# Patient Record
Sex: MALE | Race: WHITE | URBAN - METROPOLITAN AREA
[De-identification: names, ages, dates, MRNs, and addresses within clinical notes are randomized per-mention and may not be internally consistent; named-entity substitution may affect disease eponyms.]

---

## 2018-05-24 ENCOUNTER — OFFICE VISIT (OUTPATIENT)
Dept: INTERNAL MEDICINE CLINIC | Age: 33
End: 2018-05-24

## 2018-05-24 VITALS
TEMPERATURE: 98.1 F | HEIGHT: 69 IN | BODY MASS INDEX: 33.18 KG/M2 | HEART RATE: 73 BPM | DIASTOLIC BLOOD PRESSURE: 73 MMHG | SYSTOLIC BLOOD PRESSURE: 132 MMHG | OXYGEN SATURATION: 97 % | WEIGHT: 224 LBS | RESPIRATION RATE: 16 BRPM

## 2018-05-24 DIAGNOSIS — H91.93 DECREASED HEARING OF BOTH EARS: ICD-10-CM

## 2018-05-24 DIAGNOSIS — Z23 ENCOUNTER FOR IMMUNIZATION: ICD-10-CM

## 2018-05-24 DIAGNOSIS — Z00.00 PHYSICAL EXAM, ANNUAL: Primary | ICD-10-CM

## 2018-05-24 RX ORDER — CETIRIZINE HCL 10 MG
10 TABLET ORAL DAILY
COMMUNITY

## 2018-05-24 NOTE — MR AVS SNAPSHOT
[address] [facility] 
[phone] Patient: Duglas Hendrickson MRN: [id number] TEQ:[id number]/[id number] Visit Information Date & Time Provider Department Dept. Phone Encounter #  
 5/24/2018  9:30 AM Taco Eng, [address] [place] [address],[address] [phone] [id number] Follow-up Instructions Return in about 1 year (around 5/24/2019). Upcoming Health Maintenance Date Due DTaP/Tdap/Td series (1 - Tdap) 9/10/2007 Influenza Age 5 to Adult 8/1/2018 Allergies as of 5/24/2018  Review Complete On: 5/24/2018 By: Taco Eng MD  
 No Known Allergies Current Immunizations  Never Reviewed No immunizations on file. Not reviewed this visit You Were Diagnosed With   
  
 Codes Comments Physical exam, annual    -  Primary ICD-10-CM: Z00.00 ICD-9-CM: V70.0 Decreased hearing of both ears     ICD-10-CM: H91.93 
ICD-9-CM: 389. 9 Vitals BP Pulse Temp Resp Height(growth percentile) Weight(growth percentile) 132/73 (BP 1 Location: Left arm, BP Patient Position: Sitting) 73 98.1 °F (36.7 °C) (Oral) 16 5' 8.75\" (1.746 m) 224 lb (101.6 kg) SpO2 BMI Smoking Status 97% 33.32 kg/m2 Never Smoker Vitals History BMI and BSA Data Body Mass Index Body Surface Area  
 33.32 kg/m 2 2.22 m 2 Preferred Pharmacy Pharmacy Name Phone Jeanna Tr[facility] [phone] Your Updated Medication List  
  
   
This list is accurate as of 5/24/18  9:41 AM.  Always use your most recent med list.  
  
  
  
  
 ZyrTEC 10 mg tablet Generic drug:  cetirizine Take 10 mg by mouth daily. We Performed the Following CBC W/O DIFF [32827 CPT(R)] LIPID PANEL [96845 CPT(R)] METABOLIC PANEL, COMPREHENSIVE [98608 CPT(R)] REFERRAL TO ENT-OTOLARYNGOLOGY [QWA50 Custom] TSH 3RD GENERATION [59456 CPT(R)] URINALYSIS W/ RFLX MICROSCOPIC [72331 CPT(R)] Follow-up Instructions Return in about 1 year (around 5/24/2019). Referral Information Referral ID Referred By Referred To  
  
 [id number] EZEKIEL [address] [facility] [address] [place] [address] Fax: [phone] Visits Status Start Date End Date 1 New Request 5/24/18 5/24/19 If your referral has a status of pending review or denied, additional information will be sent to support the outcome of this decision. Introducing [facility]! Caty Rosales introduces Shareight patient portal. Now you can access parts of your medical record, email your doctor's office, and request medication refills online. 1. In your internet browser, go to [email or web address] 2. Click on the First Time User? Click Here link in the Sign In box. You will see the New Member Sign Up page. 3. Enter your Shareight Access Code exactly as it appears below. You will not need to use this code after youve completed the sign-up process. If you do not sign up before the expiration date, you must request a new code. · Shareight Access Code: [email or web address] Expires: 8/22/2018  9:26 AM 
 
4. Enter the last four digits of your Social Security Number (xxxx) and Date of Birth (mm/dd/yyyy) as indicated and click Submit. You will be taken to the next sign-up page. 5. Create a Chaordixt ID. This will be your Chaordixt login ID and cannot be changed, so think of one that is secure and easy to remember. 6. Create a Chaordixt password. You can change your password at any time. 7. Enter your Password Reset Question and Answer. This can be used at a later time if you forget your password. 8. Enter your e-mail address. You will receive e-mail notification when new information is available in [address] 9. Click Sign Up. You can now view and download portions of your medical record. 10. Click the Download Summary menu link to download a portable copy of your medical information. If you have questions, please visit the Frequently Asked Questions section of the XIPWIRE website. Remember, XIPWIRE is NOT to be used for urgent needs. For medical emergencies, dial 911. Now available from your iPhone and Android! Please provide this summary of care documentation to your next provider. Your primary care clinician is listed as Kathy Rodriguez. If you have any questions after today's visit, please call [phone].

## 2018-05-24 NOTE — PROGRESS NOTES
HISTORY OF PRESENT ILLNESS  Winston Marc is a 33 y.o. male. HPI   Pt is here to establish care. BP is 132/73    Wt is 224 lbs today, up 30 lbs in the last 2 years  He ran the [facility] last year  He is not exercising at the moment  He drinks a lot of caloric beverages  His goal wt is ~170s lbs   Advised him to exercise   Discussed decreasing caloric beverage and increasing water intake    Discussed medical consequences of obesity    Will get labs today     Continues zyrtec OTC 10mg prn for allergies, which works well    Pt sees a counselor for past family issues    Pt c/o soreness to knees and heel when he awakes in the AM  Pt states that his sx improve with movement   Pt wonders if his sx are d/t wt gain  Discussed this being a contributing factor  Discussed deconditioning also being a contributing factor   Discussed him possibly having plantar fascitis   Provided him with exercises to complete at home  Pt is not amenable to completing imaging today    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  Pt states that his wife states that he listens to the TV at a loud volume   Pt wonders if he can complete a hearing eval in this office  Discussed going to an ENT/audiologist prn   Provided referral for an ENT    PMHx:  N/A    FMHx:  Father - passed away from [address] [place] [address] (unsure of type)    PSHx:  [place] teeth removal     SHx:     Never smoker  Alcohol use (once weekly)   with a  baby      PREVENTIVE:  Colonoscopy: not yet needed, denies FMHx colon cancer   PSA: not yet needed  AAA screen: not yet needed  Tdap: ordered 18   Pneumovax: not yet needed  Shingrix: not yet needed  Flu shot: declines, advised him to consider this vaccine  Eye exam: Dr. Simon Leach,  or , annually, due  or  at another ophtha's office   Lipids: ordered 18     There are no active problems to display for this patient. No past surgical history on file.    No results found for: WBC, WBCT, WBCPOC, HGB, HGBPOC, HCT, HCTPOC, PLT, PLTPOC, MCV, MCVPOC, HGBEXT, HCTEXT, PLTEXT  No results found for: CHOL, CHOLPOCT, HDL, LDL, LDLC, LDLCPOC, LDLCEXT, TRIGL, TGLPOCT, CHHD, CHHDX  No results found for: GFRNA, GFRNAPOC, GFRAA, GFRAAPOC, CREA, CREAPOC, BUN, IBUN, BUNPOC, NA, NAPOC, K, KPOCT, CL, CLPOC, CO2, CO2POC, MG, PHOS, ALBEU, PTH, PTHILT, EPO     Review of Systems   Constitutional: Negative for chills and fever. HENT: Positive for hearing loss. Negative for tinnitus. Eyes: Negative for blurred vision and double vision. Respiratory: Negative for shortness of breath and wheezing. Cardiovascular: Negative for chest pain and palpitations. Gastrointestinal: Negative for nausea and vomiting. Genitourinary: Negative for dysuria and frequency. Musculoskeletal: Negative for back pain and falls. Skin: Negative for itching and rash. Neurological: Negative for dizziness, loss of consciousness and headaches. Psychiatric/Behavioral: Negative for depression. The patient is not nervous/anxious. Physical Exam   Constitutional: He is oriented to person, place, and time. He appears well-developed and well-nourished. No distress. HENT:   Head: Normocephalic and atraumatic. Right Ear: External ear normal.   Left Ear: External ear normal.   Mouth/Throat: Oropharynx is clear and moist. No oropharyngeal exudate. Eyes: Conjunctivae and EOM are normal. Pupils are equal, round, and reactive to light. Right eye exhibits no discharge. Left eye exhibits no discharge. No scleral icterus. Neck: Normal range of motion. Neck supple. No thyromegaly present. No carotid bruits    Cardiovascular: Normal rate, regular rhythm, normal heart sounds and intact distal pulses. Exam reveals no gallop and no friction rub. No murmur heard. Pulmonary/Chest: Effort normal and breath sounds normal. No respiratory distress. He has no wheezes. He has no rales. He exhibits no tenderness. Abdominal: Soft.  He exhibits no distension and no mass. There is no tenderness. There is no rebound and no guarding. Musculoskeletal: Normal range of motion. He exhibits no edema, tenderness or deformity. 5/5 strength BLE  No crepitus to knees   Lymphadenopathy:     He has no cervical adenopathy. Neurological: He is alert and oriented to person, place, and time. He has normal reflexes. He exhibits normal muscle tone. Coordination normal.   Skin: Skin is warm and dry. No rash noted. He is not diaphoretic. No erythema. No pallor. Psychiatric: He has a normal mood and affect. His behavior is normal.       ASSESSMENT and PLAN    ICD-10-CM ICD-9-CM    1. Physical exam, annual    Discussed diet and w/l, pt has a a lot of room for improvement in his diet, drinks a lot of sodas, will work on this, will have him get Tdap at his local pharmacy, eye exam UTD, due in 6/18 or 7/18, labs today, declines flu shot, discussed getting a flu shot annually    Z00.00 V70.0 LIPID PANEL      TSH 3RD GENERATION      CBC W/O DIFF      METABOLIC PANEL, COMPREHENSIVE      URINALYSIS W/ RFLX MICROSCOPIC   2. Decreased hearing of both ears    Hearing test    H91.93 389.9 REFERRAL TO ENT-OTOLARYNGOLOGY        Depression screen reviewed and negative. Scribed by Chris Gardner of [address], as dictated by Dr. Maribel Deutsch. Current diagnosis and concerns discussed with pt at length. Pt understands risks and benefits or current treatment plan and medications, and accepts the treatment and medication with any possible risks. Pt asks appropriate questions, which were answered. Pt was instructed to call with any concerns or problems. This note will not be viewable in [address]

## 2018-05-25 LAB
ALBUMIN SERPL-MCNC: 4.4 G/DL (ref 3.5–5.5)
ALBUMIN/GLOB SERPL: 1.4 {RATIO} (ref 1.2–2.2)
ALP SERPL-CCNC: 46 IU/L (ref 39–117)
ALT SERPL-CCNC: 25 IU/L (ref 0–44)
APPEARANCE UR: CLEAR
AST SERPL-CCNC: 20 IU/L (ref 0–40)
BILIRUB SERPL-MCNC: 0.5 MG/DL (ref 0–1.2)
BILIRUB UR QL STRIP: NEGATIVE
BUN SERPL-MCNC: 18 MG/DL (ref 6–20)
BUN/CREAT SERPL: 18 (ref 9–20)
CALCIUM SERPL-MCNC: 9.1 MG/DL (ref 8.7–10.2)
CHLORIDE SERPL-SCNC: 100 MMOL/L (ref 96–106)
CHOLEST SERPL-MCNC: 155 MG/DL (ref 100–199)
CO2 SERPL-SCNC: 25 MMOL/L (ref 18–29)
COLOR UR: YELLOW
CREAT SERPL-MCNC: 0.99 MG/DL (ref 0.76–1.27)
ERYTHROCYTE [DISTWIDTH] IN BLOOD BY AUTOMATED COUNT: 13.1 % (ref 12.3–15.4)
GFR SERPLBLD CREATININE-BSD FMLA CKD-EPI: 101 ML/MIN/1.73
GFR SERPLBLD CREATININE-BSD FMLA CKD-EPI: 117 ML/MIN/1.73
GLOBULIN SER CALC-MCNC: 3.1 G/DL (ref 1.5–4.5)
GLUCOSE SERPL-MCNC: 89 MG/DL (ref 65–99)
GLUCOSE UR QL: NEGATIVE
HCT VFR BLD AUTO: 41.2 % (ref 37.5–51)
HDLC SERPL-MCNC: 52 MG/DL
HGB BLD-MCNC: 14.1 G/DL (ref 13–17.7)
HGB UR QL STRIP: NEGATIVE
KETONES UR QL STRIP: NEGATIVE
LDLC SERPL CALC-MCNC: 85 MG/DL (ref 0–99)
LEUKOCYTE ESTERASE UR QL STRIP: NEGATIVE
MCH RBC QN AUTO: 31.3 PG (ref 26.6–33)
MCHC RBC AUTO-ENTMCNC: 34.2 G/DL (ref 31.5–35.7)
MCV RBC AUTO: 92 FL (ref 79–97)
MICRO URNS: ABNORMAL
NITRITE UR QL STRIP: NEGATIVE
PH UR STRIP: 6 [PH] (ref 5–7.5)
PLATELET # BLD AUTO: 151 X10E3/UL (ref 150–379)
POTASSIUM SERPL-SCNC: 3.8 MMOL/L (ref 3.5–5.2)
PROT SERPL-MCNC: 7.5 G/DL (ref 6–8.5)
PROT UR QL STRIP: NEGATIVE
RBC # BLD AUTO: 4.5 X10E6/UL (ref 4.14–5.8)
SODIUM SERPL-SCNC: 138 MMOL/L (ref 134–144)
SP GR UR: >=1.03 (ref 1–1.03)
TRIGL SERPL-MCNC: 88 MG/DL (ref 0–149)
TSH SERPL DL<=0.005 MIU/L-ACNC: 1.85 UIU/ML (ref 0.45–4.5)
UROBILINOGEN UR STRIP-MCNC: 0.2 MG/DL (ref 0.2–1)
VLDLC SERPL CALC-MCNC: 18 MG/DL (ref 5–40)
WBC # BLD AUTO: 8.5 X10E3/UL (ref 3.4–10.8)

## 2019-06-28 ENCOUNTER — OFFICE VISIT (OUTPATIENT)
Dept: INTERNAL MEDICINE CLINIC | Age: 34
End: 2019-06-28

## 2019-06-28 VITALS
RESPIRATION RATE: 16 BRPM | SYSTOLIC BLOOD PRESSURE: 108 MMHG | BODY MASS INDEX: 31.25 KG/M2 | WEIGHT: 211 LBS | HEART RATE: 67 BPM | DIASTOLIC BLOOD PRESSURE: 68 MMHG | TEMPERATURE: 97.8 F | HEIGHT: 69 IN

## 2019-06-28 DIAGNOSIS — Z00.00 PHYSICAL EXAM, ANNUAL: Primary | ICD-10-CM

## 2019-06-28 DIAGNOSIS — I83.90 VARICOSE VEINS OF LOWER EXTREMITY, UNSPECIFIED LATERALITY, UNSPECIFIED WHETHER COMPLICATED: ICD-10-CM

## 2019-06-28 NOTE — PROGRESS NOTES
HISTORY OF PRESENT ILLNESS  Vasyl Velasco is a 29 y.o. male. HPI   Pt was last here 5/24/18. Pt is here for routine care.      BP is controlled.      Wt is 224 lbs today, down today   Pt has been dealing with depression and ADHD recently which he states has made wl difficult   He has a 3year old at home that is keeping him busy   Not on any meds doing much better now  He states he now has a childcare plan and can make more time for the gym   Discussed diet and wl  Discussed his goal should be below 200 lbs     Reviewed labs 5/18   Will repeat labs today      Continues zyrtec OTC 10mg prn for allergies, which works well       In 2015 pt ran a marathon and had an IT band issue   Pt states the vein is more visible now in his right leg   Discussed these are varicose veins   Discussed this gets worse with age, obesity, standing and sitting   Advised to use compression hose if it bothers him        PREVENTIVE:  Colonoscopy: not yet needed, denies FMHx colon cancer   PSA: not yet needed  AAA screen: not yet needed  Tdap: ordered 05/24/18   Pneumovax: not yet needed  Shingrix: not yet needed  Flu shot: advised to get fall 2019   Eye exam: Dr. Darwin Noe, 6/18 or 7/18, scheduled for this summer 2019  Lipids: 05/24/18 85      There are no active problems to display for this patient. Current Outpatient Medications   Medication Sig Dispense Refill    cetirizine (ZYRTEC) 10 mg tablet Take 10 mg by mouth daily.        Past Surgical History:   Procedure Laterality Date    HX HEENT      wisdom teeth      Lab Results   Component Value Date/Time    WBC 8.5 05/24/2018 09:56 AM    HGB 14.1 05/24/2018 09:56 AM    HCT 41.2 05/24/2018 09:56 AM    PLATELET 438 62/97/6875 09:56 AM    MCV 92 05/24/2018 09:56 AM     Lab Results   Component Value Date/Time    Cholesterol, total 155 05/24/2018 09:56 AM    HDL Cholesterol 52 05/24/2018 09:56 AM    LDL, calculated 85 05/24/2018 09:56 AM    Triglyceride 88 05/24/2018 09:56 AM     Lab Results Component Value Date/Time    GFR est non- 05/24/2018 09:56 AM    GFR est  05/24/2018 09:56 AM    Creatinine 0.99 05/24/2018 09:56 AM    BUN 18 05/24/2018 09:56 AM    Sodium 138 05/24/2018 09:56 AM    Potassium 3.8 05/24/2018 09:56 AM    Chloride 100 05/24/2018 09:56 AM    CO2 25 05/24/2018 09:56 AM        Review of Systems   Respiratory: Negative for shortness of breath. Cardiovascular: Negative for chest pain. Physical Exam   Constitutional: He is oriented to person, place, and time. He appears well-developed and well-nourished. No distress. HENT:   Head: Normocephalic and atraumatic. Right Ear: External ear normal.   Left Ear: External ear normal.   Mouth/Throat: Oropharynx is clear and moist. No oropharyngeal exudate. Eyes: Conjunctivae and EOM are normal. Right eye exhibits no discharge. Left eye exhibits no discharge. Neck: Normal range of motion. Neck supple. No carotid bruits    Cardiovascular: Normal rate, regular rhythm and normal heart sounds. Exam reveals no gallop and no friction rub. No murmur heard. Pulmonary/Chest: Effort normal and breath sounds normal. No respiratory distress. He has no wheezes. He has no rales. He exhibits no tenderness. Abdominal: Soft. He exhibits no distension and no mass. There is no tenderness. There is no rebound and no guarding. Musculoskeletal: Normal range of motion. He exhibits no edema, tenderness or deformity. Lymphadenopathy:     He has no cervical adenopathy. Neurological: He is alert and oriented to person, place, and time. He has normal reflexes. Coordination normal.   Skin: Skin is warm and dry. No rash noted. He is not diaphoretic. No erythema. No pallor. Psychiatric: He has a normal mood and affect. His behavior is normal.       ASSESSMENT and PLAN    ICD-10-CM ICD-9-CM    1.  Physical exam, annual      Discussed diet, wt loss, BP normal, labs ordered, tdap utd, discussed annual flu shot  W04.72 N75.5 METABOLIC PANEL, COMPREHENSIVE      LIPID PANEL      CBC W/O DIFF      TSH 3RD GENERATION   2. Varicose veins of lower extremity, unspecified laterality, unspecified whether complicated      Discussed benign etiology, compression hose, leg elevation and wt loss  D13.40 868.4 METABOLIC PANEL, COMPREHENSIVE      LIPID PANEL      CBC W/O DIFF      TSH 3RD GENERATION    Depression screen reviewed and negative. Scribed by Gaye Mari of [address] [place] [address] [address], as dictated by Dr. Ino Arce.     Current diagnosis and concerns discussed with pt at length. Pt understands risks and benefits or current treatment plan and medications, and accepts the treatment and medication with any possible risks. Pt asks appropriate questions, which were answered. Pt was instructed to call with any concerns or problems.

## 2019-06-29 LAB
ALBUMIN SERPL-MCNC: 4.7 G/DL (ref 3.5–5.5)
ALBUMIN/GLOB SERPL: 1.6 {RATIO} (ref 1.2–2.2)
ALP SERPL-CCNC: 44 IU/L (ref 39–117)
ALT SERPL-CCNC: 25 IU/L (ref 0–44)
AST SERPL-CCNC: 22 IU/L (ref 0–40)
BILIRUB SERPL-MCNC: 0.6 MG/DL (ref 0–1.2)
BUN SERPL-MCNC: 16 MG/DL (ref 6–20)
BUN/CREAT SERPL: 15 (ref 9–20)
CALCIUM SERPL-MCNC: 9.6 MG/DL (ref 8.7–10.2)
CHLORIDE SERPL-SCNC: 101 MMOL/L (ref 96–106)
CHOLEST SERPL-MCNC: 155 MG/DL (ref 100–199)
CO2 SERPL-SCNC: 27 MMOL/L (ref 20–29)
CREAT SERPL-MCNC: 1.04 MG/DL (ref 0.76–1.27)
ERYTHROCYTE [DISTWIDTH] IN BLOOD BY AUTOMATED COUNT: 12.8 % (ref 12.3–15.4)
GLOBULIN SER CALC-MCNC: 2.9 G/DL (ref 1.5–4.5)
GLUCOSE SERPL-MCNC: 88 MG/DL (ref 65–99)
HCT VFR BLD AUTO: 38.4 % (ref 37.5–51)
HDLC SERPL-MCNC: 52 MG/DL
HGB BLD-MCNC: 13.7 G/DL (ref 13–17.7)
LDLC SERPL CALC-MCNC: 85 MG/DL (ref 0–99)
MCH RBC QN AUTO: 31.9 PG (ref 26.6–33)
MCHC RBC AUTO-ENTMCNC: 35.7 G/DL (ref 31.5–35.7)
MCV RBC AUTO: 90 FL (ref 79–97)
PLATELET # BLD AUTO: 119 X10E3/UL (ref 150–450)
POTASSIUM SERPL-SCNC: 4.4 MMOL/L (ref 3.5–5.2)
PROT SERPL-MCNC: 7.6 G/DL (ref 6–8.5)
RBC # BLD AUTO: 4.29 X10E6/UL (ref 4.14–5.8)
SODIUM SERPL-SCNC: 142 MMOL/L (ref 134–144)
TRIGL SERPL-MCNC: 89 MG/DL (ref 0–149)
TSH SERPL DL<=0.005 MIU/L-ACNC: 1.82 UIU/ML (ref 0.45–4.5)
VLDLC SERPL CALC-MCNC: 18 MG/DL (ref 5–40)
WBC # BLD AUTO: 6.2 X10E3/UL (ref 3.4–10.8)

## 2019-06-29 NOTE — PROGRESS NOTES
Mild low plt on lab    This can be an error from clumping at the lab    Repeat cbc in 1 month for further eval

## 2019-07-02 DIAGNOSIS — Z00.00 PHYSICAL EXAM, ANNUAL: ICD-10-CM

## 2019-07-02 DIAGNOSIS — D69.6 DECREASED PLATELET COUNT (HCC): Primary | ICD-10-CM

## 2019-07-02 NOTE — PROGRESS NOTES
Called and spoke to pt. Two pt identifiers confirmed. Pt informed per Dr. Nick Munroe platelets were mildly low. Pt informed per Dr. Nick Munroe this can be an error from clumping at the lab. Pt informed per Dr. Nick Munroe to repeat cbc in 1 month - ordered. Pt verbalized understanding with no further questions.

## 2019-07-19 DIAGNOSIS — D69.6 DECREASED PLATELET COUNT (HCC): ICD-10-CM

## 2019-07-20 LAB
ERYTHROCYTE [DISTWIDTH] IN BLOOD BY AUTOMATED COUNT: 13.2 % (ref 12.3–15.4)
HCT VFR BLD AUTO: 39.9 % (ref 37.5–51)
HGB BLD-MCNC: 13.7 G/DL (ref 13–17.7)
MCH RBC QN AUTO: 31.4 PG (ref 26.6–33)
MCHC RBC AUTO-ENTMCNC: 34.3 G/DL (ref 31.5–35.7)
MCV RBC AUTO: 92 FL (ref 79–97)
PLATELET # BLD AUTO: 135 X10E3/UL (ref 150–450)
RBC # BLD AUTO: 4.36 X10E6/UL (ref 4.14–5.8)
WBC # BLD AUTO: 7.9 X10E3/UL (ref 3.4–10.8)

## 2019-07-23 DIAGNOSIS — D69.6 DECREASED PLATELET COUNT (HCC): Primary | ICD-10-CM

## 2019-07-23 NOTE — PROGRESS NOTES
Called, spoke to pt. Two pt identifiers confirmed. Pt informed per Dr. Eren Card improved, will monitor. Pt informed per Dr. Eren Card repeat cbc no diff in 6 months. Labs ordered and mailed to pt. Pt verbalized understanding of information discussed w/ no further questions at this time.

## 2020-01-17 DIAGNOSIS — D69.6 DECREASED PLATELET COUNT (HCC): ICD-10-CM

## 2020-01-18 LAB
ERYTHROCYTE [DISTWIDTH] IN BLOOD BY AUTOMATED COUNT: 12 % (ref 11.6–15.4)
HCT VFR BLD AUTO: 39.9 % (ref 37.5–51)
HGB BLD-MCNC: 14 G/DL (ref 13–17.7)
MCH RBC QN AUTO: 31.4 PG (ref 26.6–33)
MCHC RBC AUTO-ENTMCNC: 35.1 G/DL (ref 31.5–35.7)
MCV RBC AUTO: 90 FL (ref 79–97)
PLATELET # BLD AUTO: 157 X10E3/UL (ref 150–450)
RBC # BLD AUTO: 4.46 X10E6/UL (ref 4.14–5.8)
WBC # BLD AUTO: 7.3 X10E3/UL (ref 3.4–10.8)

## 2020-10-20 NOTE — PROGRESS NOTES
HISTORY OF PRESENT ILLNESS  Fatoumata Green is a 30 y.o. male. HPI     Pt was last here 6/28/19. Pt is here for routine care. This is an established visit completed with telemedicine was completed with video assist  the patient acknowledges and agrees to this method of visitation doxyme     BP was 108/68 at urgent care     Wt was 211 lbs at urgent care - down 12 lbs x lov    He ran a half marathon at the beginning of this year  Discussed diet and wl and portions      Reviewed labs    He tested negative for covid 9/20  Low platelet levels improved on last labs   Ordered labs   He wonders about checking for lead levels due to being a  in the past   Discussed this is not something I check for and to see if it's done through work      Continues zyrtec OTC 10mg prn for allergies, which works well         PREVENTIVE:  Colonoscopy: not yet needed, denies FMHx colon cancer   PSA: not yet needed  AAA screen: not yet needed  Tdap:05/24/18   Pneumovax: not yet needed  Shingrix: not yet needed  Flu shot: declines   Eye exam: Dr. Noel, 6/20   [facility]       There are no active problems to display for this patient. Current Outpatient Medications   Medication Sig Dispense Refill    cetirizine (ZYRTEC) 10 mg tablet Take 10 mg by mouth daily.        Past Surgical History:   Procedure Laterality Date    HX HEENT      wisdom teeth      Lab Results   Component Value Date/Time    WBC 7.3 01/17/2020 03:40 PM    HGB 14.0 01/17/2020 03:40 PM    HCT 39.9 01/17/2020 03:40 PM    PLATELET 115 52/79/7162 03:40 PM    MCV 90 01/17/2020 03:40 PM     Lab Results   Component Value Date/Time    Cholesterol, total 155 06/28/2019 03:17 PM    HDL Cholesterol 52 06/28/2019 03:17 PM    LDL, calculated 85 06/28/2019 03:17 PM    Triglyceride 89 06/28/2019 03:17 PM     Lab Results   Component Value Date/Time    GFR est non-AA 95 06/28/2019 03:17 PM    GFR est  06/28/2019 03:17 PM    Creatinine 1.04 06/28/2019 03:17 PM    BUN 16 06/28/2019 03:17 PM    Sodium 142 06/28/2019 03:17 PM    Potassium 4.4 06/28/2019 03:17 PM    Chloride 101 06/28/2019 03:17 PM    CO2 27 06/28/2019 03:17 PM        Review of Systems   Respiratory: Negative for shortness of breath. Cardiovascular: Negative for chest pain. Physical Exam  Constitutional:       General: He is not in acute distress. Appearance: Normal appearance. He is not ill-appearing, toxic-appearing or diaphoretic. HENT:      Head: Normocephalic and atraumatic. Eyes:      General:         Right eye: No discharge. Left eye: No discharge. Conjunctiva/sclera: Conjunctivae normal.   Pulmonary:      Effort: No respiratory distress. Neurological:      Mental Status: He is alert and oriented to person, place, and time. Mental status is at baseline. Gait: Gait normal.   Psychiatric:         Mood and Affect: Mood normal.         Behavior: Behavior normal.         ASSESSMENT and PLAN    ICD-10-CM ICD-9-CM    1. Physical exam         Declines flu shot    Wt improved, has nl bp in past    Due for labs    tdap utd        Z00.00 V70.9 LIPID PANEL      METABOLIC PANEL, COMPREHENSIVE      CBC W/O DIFF      TSH 3RD GENERATION   2. Thrombocytopenia (HCC)  D69.6 287.5 LIPID PANEL   Improved on repeat labs   METABOLIC PANEL, COMPREHENSIVE      CBC W/O DIFF      TSH 3RD GENERATION   3. Seasonal allergic rhinitis due to pollen    Zyrtec prn  J30.1 477.0            Scribed by Silvia Persons, as dictated by Dr. Mike Dietz. Current diagnosis and concerns discussed with pt at length. Pt understands risks and benefits or current treatment plan and medications, and accepts the treatment and medication with any possible risks. Pt asks appropriate questions, which were answered. Pt was instructed to call with any concerns or problems. I have reviewed the note documented by the scribe. The services provided are my own.   The documentation is accurate     Theta Plinga Michael Cabrera, who was evaluated through a synchronous (real-time) audio-video encounter, and/or his healthcare decision maker, is aware that it is a billable service, with coverage as determined by his insurance carrier. He provided verbal consent to proceed: Yes, and patient identification was verified. It was conducted pursuant to the emergency declaration under the [address] [place] [address] and the [employer] and [employer] General Act. A caregiver was present when appropriate. Ability to conduct physical exam was limited. I was at home. The patient was at home.

## 2020-10-23 ENCOUNTER — VIRTUAL VISIT (OUTPATIENT)
Dept: INTERNAL MEDICINE CLINIC | Age: 35
End: 2020-10-23
Payer: COMMERCIAL

## 2020-10-23 DIAGNOSIS — J30.1 SEASONAL ALLERGIC RHINITIS DUE TO POLLEN: ICD-10-CM

## 2020-10-23 DIAGNOSIS — D69.6 THROMBOCYTOPENIA (HCC): ICD-10-CM

## 2020-10-23 DIAGNOSIS — Z00.00 PHYSICAL EXAM: Primary | ICD-10-CM

## 2020-10-23 PROCEDURE — 99395 PREV VISIT EST AGE 18-39: CPT | Performed by: INTERNAL MEDICINE

## 2020-12-31 ENCOUNTER — OFFICE VISIT (OUTPATIENT)
Dept: URGENT CARE | Age: 35
End: 2020-12-31
Payer: COMMERCIAL

## 2020-12-31 VITALS — HEART RATE: 66 BPM | RESPIRATION RATE: 14 BRPM | OXYGEN SATURATION: 97 % | TEMPERATURE: 98.2 F

## 2020-12-31 DIAGNOSIS — Z20.822 ENCOUNTER FOR LABORATORY TESTING FOR COVID-19 VIRUS: Primary | ICD-10-CM

## 2020-12-31 PROCEDURE — 99201 PR OFFICE OUTPATIENT NEW 10 MINUTES: CPT | Performed by: NURSE PRACTITIONER

## 2020-12-31 NOTE — PROGRESS NOTES
This patient was seen at [address] [place] [address] [facility]gent Care while in their vehicle due to COVID-19 pandemic with PPE and focused examination in order to decrease community viral transmission. The patient/guardian gave verbal consent to treat. Zully Ortega is a 30 y.o. male presenting to clinic today for COVID-19 testing. 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 States that he needs to be tested for his employer. Denies any symptoms. The history is provided by the patient. History limited by: nothing. No past medical history on file.      Past Surgical History:   Procedure Laterality Date    HX HEENT      wisdom teeth         Family History   Problem Relation Age of Onset    Cancer Father         Social History     Socioeconomic History    Marital status:      Spouse name: Not on file    Number of children: Not on file    Years of education: Not on file    Highest education level: Not on file   Occupational History    Not on file   Social Needs    Financial resource strain: Not on file    Food insecurity     Worry: Not on file     Inability: Not on file    Transportation needs     Medical: Not on file     Non-medical: Not on file   Tobacco Use    Smoking status: Never Smoker    Smokeless tobacco: Never Used   Substance and Sexual Activity    Alcohol use: Yes     Comment: once weekly     Drug use: No    Sexual activity: Yes     Partners: Female     Birth control/protection: None   Lifestyle    Physical activity     Days per week: Not on file     Minutes per session: Not on file    Stress: Not on file   Relationships    Social connections     Talks on phone: Not on file     Gets together: Not on file     Attends Sabianist service: Not on file     Active member of club or organization: Not on file     Attends meetings of clubs or organizations: Not on file     Relationship status: Not on file    Intimate partner violence     Fear of current or ex partner: Not on file     Emotionally abused: Not on file     Physically abused: Not on file     Forced sexual activity: Not on file   Other Topics Concern    Not on file   Social History Narrative    Not on file                ALLERGIES: Patient has no known allergies. Review of Systems   Constitutional: Negative for chills and fever. HENT: Negative for congestion, rhinorrhea and sinus pain. Respiratory: Negative for cough, chest tightness and shortness of breath. Cardiovascular: Negative for chest pain. Vitals:    12/31/20 1431   Pulse: 66   Resp: 14   Temp: 98.2 °F (36.8 °C)   SpO2: 97%       Physical Exam  Vitals signs and nursing note reviewed. Constitutional:       General: He is not in acute distress. Appearance: Normal appearance. He is not ill-appearing, toxic-appearing or diaphoretic. HENT:      Head: Normocephalic and atraumatic. Eyes:      Extraocular Movements: Extraocular movements intact. Conjunctiva/sclera: Conjunctivae normal.   Neck:      Musculoskeletal: Normal range of motion. Cardiovascular:      Rate and Rhythm: Normal rate. Pulmonary:      Effort: Pulmonary effort is normal. No respiratory distress. Comments: Speaking in complete sentences without difficulty. Musculoskeletal: Normal range of motion. Neurological:      General: No focal deficit present. Mental Status: He is alert. Psychiatric:         Mood and Affect: Mood normal.         Behavior: Behavior normal.         Thought Content: Thought content normal.         Judgment: Judgment normal.         MDM  PLAN:  Patient presents to clinic for COVID-19 test for his employer. Reports secondary exposure through his child, whose teacher tested positive for the virus. 1. COVID-19 test obtained. Patient to quarantine until results return.  Discussed with patient that it is likely too soon for him to be exhibiting symptoms or to have a reliable test if he did get the virus from the teacher through his child. If he develops symptoms, he should return for another test.  2. OTC if symptoms develop. 3. Follow up with PCP as needed. 4. Go to ED with development of any acute symptoms. DIAGNOSIS:    ICD-10-CM ICD-9-CM    1.  Encounter for laboratory testing for COVID-19 virus  Z20.828 V01.79 NOVEL CORONAVIRUS (COVID-19)

## 2021-01-02 LAB — SARS-COV-2, NAA: NOT DETECTED

## 2021-03-23 ENCOUNTER — TELEPHONE (OUTPATIENT)
Dept: INTERNAL MEDICINE CLINIC | Age: 36
End: 2021-03-23

## 2021-03-23 DIAGNOSIS — Z20.5 EXPOSURE TO HEPATITIS B: Primary | ICD-10-CM

## 2021-03-23 NOTE — TELEPHONE ENCOUNTER
----- Message from Noemy Dani sent at 3/23/2021  4:16 PM EDT -----  Regarding: Dr Marco Palma Message/Vendor Calls    Caller's first and last name: Pt      Reason for call: lab work      Callback required yes/no and why: yes. To discuss      Best contact number(s):[phone]       Details to clarify the request: Pt stated that Dr Angie Cruz has ordered blood work.  He would like to add \"something personal\" to the lab work and is asking for a call back to discuss       Message from [facility]

## 2021-03-24 NOTE — TELEPHONE ENCOUNTER
Pt returning call. Please call  [phone]    Pt states he would like to discuss the issue with dr Jorge Schilder directly and not anyone else, he states its a personal matter.

## 2021-03-25 NOTE — TELEPHONE ENCOUNTER
Called out and spoke to pt. Two pt identifiers confirmed. Pt states that his wife tested positive for HPV w/in last week or 2. Pt is concerned as she was tested 3yrs ago and was negative. Had HPV vaccine in '97. Wife has pending repeat labs. Pt states they have no other sexual partners and are confused w/ where this has come from. Pt is concerned if he has it and would liked to have a lab check w/ his routine labs. Pt asking what he would have to do if he or his wife are positive moving forward. Informed pt to await wife's repeat results. Pt informed Dr. Kiersten Sanabria will be consulted for further recommendations.

## 2021-03-26 NOTE — TELEPHONE ENCOUNTER
Called out and spoke to pt. Two pt identifiers confirmed. Pt states that his wife's liver enzymes were elevated and they are testing his wife for HBV and not HPV. Pt states that his wife has yet to receive her results. Informed pt on how it is transmitted and to withhold from sexual activity if concern for infection. Pt asking if he can get checked if he has or was exposed to HBV at one point. Pt informed Dr. Nick Stokes will be consulted for further recommendations. Pt verbalized understanding of information discussed w/ no further questions at this time.

## 2021-03-26 NOTE — TELEPHONE ENCOUNTER
Patient states he needs a call back to Further discuss information from yesterday as patient is concerned he used the incorrect Acronyms. Please call to discuss.  Thank you

## 2021-03-29 ENCOUNTER — TELEPHONE (OUTPATIENT)
Dept: INTERNAL MEDICINE CLINIC | Age: 36
End: 2021-03-29

## 2021-03-31 NOTE — TELEPHONE ENCOUNTER
Called out and spoke to pt. Two pt identifiers confirmed. Pt informed per Dr. Trisha Chavez that Hep B labs ordered. Pt states he will come in Friday morning for labs. Pt states his wife's results do show Hep B. Pt asking if he should do the HepB vaccine booster. Pt verbalized understanding of information discussed w/ no further questions at this time.

## 2021-04-03 LAB
ALBUMIN SERPL-MCNC: 4.2 G/DL (ref 3.5–5)
ALBUMIN/GLOB SERPL: 1.3 {RATIO} (ref 1.1–2.2)
ALP SERPL-CCNC: 44 U/L (ref 45–117)
ALT SERPL-CCNC: 33 U/L (ref 12–78)
ANION GAP SERPL CALC-SCNC: 4 MMOL/L (ref 5–15)
AST SERPL-CCNC: 23 U/L (ref 15–37)
BILIRUB SERPL-MCNC: 0.5 MG/DL (ref 0.2–1)
BUN SERPL-MCNC: 18 MG/DL (ref 6–20)
BUN/CREAT SERPL: 21 (ref 12–20)
CALCIUM SERPL-MCNC: <10 MG/DL (ref 8.5–10.1)
CHLORIDE SERPL-SCNC: 107 MMOL/L (ref 97–108)
CHOLEST SERPL-MCNC: 147 MG/DL
CO2 SERPL-SCNC: 27 MMOL/L (ref 21–32)
CREAT SERPL-MCNC: 0.87 MG/DL (ref 0.7–1.3)
ERYTHROCYTE [DISTWIDTH] IN BLOOD BY AUTOMATED COUNT: 11.9 % (ref 11.5–14.5)
GLOBULIN SER CALC-MCNC: 3.2 G/DL (ref 2–4)
GLUCOSE SERPL-MCNC: 69 MG/DL (ref 65–100)
HBV SURFACE AB SER QL: NONREACTIVE
HBV SURFACE AB SER-ACNC: <3.1 MIU/ML
HBV SURFACE AG SER QL: 0.4 INDEX
HBV SURFACE AG SER QL: NEGATIVE
HCT VFR BLD AUTO: 39.9 % (ref 36.6–50.3)
HDLC SERPL-MCNC: 49 MG/DL
HDLC SERPL: 3 {RATIO} (ref 0–5)
HGB BLD-MCNC: 13.8 G/DL (ref 12.1–17)
LDLC SERPL CALC-MCNC: 80.2 MG/DL (ref 0–100)
LIPID PROFILE,FLP: NORMAL
MCH RBC QN AUTO: 31.4 PG (ref 26–34)
MCHC RBC AUTO-ENTMCNC: 34.6 G/DL (ref 30–36.5)
MCV RBC AUTO: 90.7 FL (ref 80–99)
NRBC # BLD: 0 K/UL (ref 0–0.01)
NRBC BLD-RTO: 0 PER 100 WBC
PLATELET # BLD AUTO: 123 K/UL (ref 150–400)
PMV BLD AUTO: 12.5 FL (ref 8.9–12.9)
POTASSIUM SERPL-SCNC: 4.4 MMOL/L (ref 3.5–5.1)
PROT SERPL-MCNC: 7.4 G/DL (ref 6.4–8.2)
RBC # BLD AUTO: 4.4 M/UL (ref 4.1–5.7)
SODIUM SERPL-SCNC: 138 MMOL/L (ref 136–145)
TRIGL SERPL-MCNC: 89 MG/DL (ref ?–150)
TSH SERPL DL<=0.05 MIU/L-ACNC: 1.62 UIU/ML (ref 0.36–3.74)
VLDLC SERPL CALC-MCNC: 17.8 MG/DL
WBC # BLD AUTO: 5.1 K/UL (ref 4.1–11.1)

## 2021-04-05 DIAGNOSIS — D69.6 THROMBOCYTOPENIA (HCC): Primary | ICD-10-CM

## 2021-04-05 NOTE — PROGRESS NOTES
Called, spoke to pt  Received two pt identifiers  Pt informed per Dr. Saul Jesus Mild low platelets  Pt informed per Dr. Saul Jesus would like to repeat labs in 1 month, ordered and mailed to pt. Rest labs are okay  Pt asked about Hep B panel. Pt informed panel came back negative however also negative for antibodies. Pt advised can come in anytime this week to get the Hep B vaccine. Pt verbalizes understanding of the instructions and has no further questions at this time.

## 2021-04-07 LAB
HBV CORE AB SERPL QL IA: NEGATIVE
HBV CORE IGM SERPL QL IA: NEGATIVE

## 2021-04-09 ENCOUNTER — CLINICAL SUPPORT (OUTPATIENT)
Dept: INTERNAL MEDICINE CLINIC | Age: 36
End: 2021-04-09
Payer: COMMERCIAL

## 2021-04-09 DIAGNOSIS — Z23 ENCOUNTER FOR IMMUNIZATION: Primary | ICD-10-CM

## 2021-04-09 PROCEDURE — 90746 HEPB VACCINE 3 DOSE ADULT IM: CPT | Performed by: INTERNAL MEDICINE

## 2021-04-09 PROCEDURE — 90471 IMMUNIZATION ADMIN: CPT | Performed by: INTERNAL MEDICINE

## 2021-04-09 NOTE — PROGRESS NOTES
GUY per Dr. Ghanshyam Braxton, 1st Hep B vaccine given after obtaining the consent form. 1.0 ml injected in the L deltoid muscle intramuscularly. Administered by Vivi Le LPN. VIS given. Informed pt on when to return for both the 2nd and 3rd vaccines. Pt verbalized understanding of information discussed w/ no further questions at this time.

## 2021-04-19 ENCOUNTER — TELEPHONE (OUTPATIENT)
Dept: INTERNAL MEDICINE CLINIC | Age: 36
End: 2021-04-19

## 2021-04-19 NOTE — TELEPHONE ENCOUNTER
----- Message from Tayo Shabazz sent at 4/19/2021 11:51 AM EDT -----  Regarding: Dr. Carmela Chowdhury  General Message/Vendor Calls    Caller's first and last name: Pt       Reason for call: Needs to speak with nurse Charles Hutchins about some current issues that is going on. Callback required yes/no and why: yes, to discuss        Best contact number(s): [phone]      Details to clarify the request: Has already spoken with him just has some follow up questions.        Message from [facility]

## 2021-04-20 NOTE — TELEPHONE ENCOUNTER
MD Tiffani Purcell LPN   Caller: Unspecified Kelby Pack, 11:53 AM)             Yes abstain until treatment is complete can discuss further with her gastroenterologist as well      Left message for patient to return call

## 2021-04-20 NOTE — TELEPHONE ENCOUNTER
Spoke with patient. Two pt identifiers confirmed. Patient states that his wife contracted Hep B. Patient states that she is now seeing a specialist.  Patient states that he has had his first booster for Hep B, but would like to know if they need to abstain for sexual activity until he has received the third shot and has had labs to check for immunity. Advised patient that I will check with Dr. Leo Massey and will give him a call back as soon as she responds. Pt verbalized understanding of information discussed w/ no further questions at this time.

## 2021-04-20 NOTE — TELEPHONE ENCOUNTER
Spoke with patient. Two pt identifiers confirmed. Patient advised per Dr. Snyder Click that he should abstain from sexual intercourse until Hep B treatment is complete. Pt verbalized understanding of information discussed w/ no further questions at this time.

## 2021-05-07 ENCOUNTER — CLINICAL SUPPORT (OUTPATIENT)
Dept: INTERNAL MEDICINE CLINIC | Age: 36
End: 2021-05-07
Payer: COMMERCIAL

## 2021-05-07 DIAGNOSIS — Z23 ENCOUNTER FOR IMMUNIZATION: Primary | ICD-10-CM

## 2021-05-07 PROCEDURE — 90746 HEPB VACCINE 3 DOSE ADULT IM: CPT | Performed by: INTERNAL MEDICINE

## 2021-05-07 PROCEDURE — 90471 IMMUNIZATION ADMIN: CPT | Performed by: INTERNAL MEDICINE

## 2021-05-07 NOTE — PROGRESS NOTES
GUY per Dr. Rodolfo Pedroza, 2nd Hep B vaccine given after obtaining the consent form. 1.0 ml injected in the L deltoid muscle intramuscularly. Administered by Krystle rGay LPN. VIS given.

## 2021-11-15 NOTE — PROGRESS NOTES
HISTORY OF PRESENT ILLNESS  Fany Vallecillo is a 29 y.o. male. HPI     Pt was last here 10/23/20. Pt is here for routine care.     He wonders about getting vasectomy, will provide referral to uro     He also mentions issues with call center and office communication regarding appt    He wonders about monoclonal antibody infusions for covid-19 if he were to test positive  Discussed this and Covid vaccine at length    He wonders about checking vit D     He also wonders about checking body fat percentage      He was at urgent care 12/31/20 for covid testing    He reports having a corneal abrasion went to urgent care and a specialist for this  This has resolved     BP today is controlled     Wt was 211 lbs lov  Discussed diet and wl and portions, [facility]  Will provide referral to wt/l clinic  He has been doing intermittent fasting     Reviewed labs    Platelets were low, discussed that if low again will f/u with hematologist to check     Continues zyrtec OTC 10mg prn for allergies, which works well      PREVENTIVE:  Colonoscopy: not yet needed, denies FMHx colon cancer   PSA: not yet needed  AAA screen: not yet needed  Tdap:05/24/18   Pneumovax: not yet needed  Shingrix: not yet needed  Flu shot: declines   Eye exam: 6/21  Lipids: 4/21 LDL 80  Hep B: will get final shot in series today 11/16/21  Covid: declines    There are no problems to display for this patient. Current Outpatient Medications   Medication Sig Dispense Refill    cetirizine (ZYRTEC) 10 mg tablet Take 10 mg by mouth daily.        Past Surgical History:   Procedure Laterality Date    HX HEENT      wisdom teeth      Lab Results   Component Value Date/Time    WBC 5.1 04/02/2021 08:15 AM    HGB 13.8 04/02/2021 08:15 AM    HCT 39.9 04/02/2021 08:15 AM    PLATELET 408 (L) 68/86/7107 08:15 AM    MCV 90.7 04/02/2021 08:15 AM     Lab Results   Component Value Date/Time    Cholesterol, total 147 04/02/2021 08:15 AM    HDL Cholesterol 49 04/02/2021 08:15 AM    LDL, calculated 80.2 04/02/2021 08:15 AM    Triglyceride 89 04/02/2021 08:15 AM    CHOL/HDL Ratio 3.0 04/02/2021 08:15 AM     Lab Results   Component Value Date/Time    GFR est non-AA >60 04/02/2021 08:15 AM    GFR est AA >60 04/02/2021 08:15 AM    Creatinine 0.87 04/02/2021 08:15 AM    BUN 18 04/02/2021 08:15 AM    Sodium 138 04/02/2021 08:15 AM    Potassium 4.4 04/02/2021 08:15 AM    Chloride 107 04/02/2021 08:15 AM    CO2 27 04/02/2021 08:15 AM        Review of Systems   Respiratory: Negative for shortness of breath. Cardiovascular: Negative for chest pain. Physical Exam  Constitutional:       General: He is not in acute distress. Appearance: Normal appearance. He is not ill-appearing, toxic-appearing or diaphoretic. HENT:      Head: Normocephalic and atraumatic. Right Ear: External ear normal.      Left Ear: External ear normal.   Eyes:      General:         Right eye: No discharge. Left eye: No discharge. Conjunctiva/sclera: Conjunctivae normal.      Pupils: Pupils are equal, round, and reactive to light. Cardiovascular:      Rate and Rhythm: Normal rate and regular rhythm. Heart sounds: No murmur heard. No friction rub. No gallop. Pulmonary:      Effort: No respiratory distress. Breath sounds: Normal breath sounds. No wheezing or rales. Chest:      Chest wall: No tenderness. Musculoskeletal:         General: Normal range of motion. Cervical back: Normal range of motion and neck supple. Skin:     General: Skin is warm and dry. Neurological:      Mental Status: He is alert and oriented to person, place, and time. Mental status is at baseline. Coordination: Coordination normal.      Gait: Gait normal.   Psychiatric:         Mood and Affect: Mood normal.         Behavior: Behavior normal.         ASSESSMENT and PLAN    ICD-10-CM ICD-9-CM    1.  Physical exam      Z00.00 V70.9 HEPATITIS C AB      LIPID PANEL   Due for labs ordered    Working on weight loss would like to see weight loss clinic place referral to interested in vasectomy referred to urology colonoscopy at age 40 declines flu shot and Covid vaccine discussed this with him    Last hepatitis B vaccine today blood pressure controlled   METABOLIC PANEL, COMPREHENSIVE      CBC W/O DIFF      TSH 3RD GENERATION      HEMOGLOBIN A1C WITH EAG   2. Thrombocytopenia ([address]  D69.6 287.5 HEPATITIS C AB      LIPID PANEL      METABOLIC PANEL, COMPREHENSIVE   Mild low platelets sporadically when we have checked we will repeat blood counts today if platelets still low will refer to hematology for further evaluation discussed at length yet   CBC W/O DIFF      TSH 3RD GENERATION      HEMOGLOBIN A1C WITH EAG        Scribed by Tara Layne of [facility] Parry, as dictated by Dr. Mike Fregoso. Current diagnosis and concerns discussed with pt at length. Pt understands risks and benefits or current treatment plan and medications, and accepts the treatment and medication with any possible risks. Pt asks appropriate questions, which were answered. Pt was instructed to call with any concerns or problems. I have reviewed the note documented by the scribe. The services provided are my own.   The documentation is accurate

## 2021-11-16 ENCOUNTER — OFFICE VISIT (OUTPATIENT)
Dept: INTERNAL MEDICINE CLINIC | Age: 36
End: 2021-11-16
Payer: COMMERCIAL

## 2021-11-16 VITALS
TEMPERATURE: 97.9 F | HEIGHT: 68 IN | HEART RATE: 64 BPM | SYSTOLIC BLOOD PRESSURE: 114 MMHG | BODY MASS INDEX: 32.49 KG/M2 | RESPIRATION RATE: 16 BRPM | DIASTOLIC BLOOD PRESSURE: 75 MMHG | WEIGHT: 214.4 LBS | OXYGEN SATURATION: 96 %

## 2021-11-16 DIAGNOSIS — Z00.00 PHYSICAL EXAM: Primary | ICD-10-CM

## 2021-11-16 DIAGNOSIS — Z23 ENCOUNTER FOR IMMUNIZATION: ICD-10-CM

## 2021-11-16 DIAGNOSIS — E55.9 VITAMIN D DEFICIENCY: ICD-10-CM

## 2021-11-16 DIAGNOSIS — D69.6 THROMBOCYTOPENIA (HCC): ICD-10-CM

## 2021-11-16 PROCEDURE — 90746 HEPB VACCINE 3 DOSE ADULT IM: CPT | Performed by: INTERNAL MEDICINE

## 2021-11-16 PROCEDURE — 90471 IMMUNIZATION ADMIN: CPT | Performed by: INTERNAL MEDICINE

## 2021-11-16 PROCEDURE — 99395 PREV VISIT EST AGE 18-39: CPT | Performed by: INTERNAL MEDICINE

## 2021-11-22 ENCOUNTER — LAB ONLY (OUTPATIENT)
Dept: INTERNAL MEDICINE CLINIC | Age: 36
End: 2021-11-22

## 2021-11-22 DIAGNOSIS — D69.6 THROMBOCYTOPENIA (HCC): ICD-10-CM

## 2021-11-22 DIAGNOSIS — E55.9 VITAMIN D DEFICIENCY: ICD-10-CM

## 2021-11-22 DIAGNOSIS — Z00.00 PHYSICAL EXAM: ICD-10-CM

## 2021-11-23 DIAGNOSIS — D69.6 TEMPORARY LOW PLATELET COUNT (HCC): ICD-10-CM

## 2021-11-23 DIAGNOSIS — E55.9 VITAMIN D DEFICIENCY: Primary | ICD-10-CM

## 2021-11-23 LAB
25(OH)D3 SERPL-MCNC: 18.9 NG/ML (ref 30–100)
ALBUMIN SERPL-MCNC: 4.2 G/DL (ref 3.5–5)
ALBUMIN/GLOB SERPL: 1.2 {RATIO} (ref 1.1–2.2)
ALP SERPL-CCNC: 57 U/L (ref 45–117)
ALT SERPL-CCNC: 35 U/L (ref 12–78)
ANION GAP SERPL CALC-SCNC: 6 MMOL/L (ref 5–15)
AST SERPL-CCNC: 21 U/L (ref 15–37)
BILIRUB SERPL-MCNC: 0.6 MG/DL (ref 0.2–1)
BUN SERPL-MCNC: 16 MG/DL (ref 6–20)
BUN/CREAT SERPL: 17 (ref 12–20)
CALCIUM SERPL-MCNC: 9.3 MG/DL (ref 8.5–10.1)
CHLORIDE SERPL-SCNC: 102 MMOL/L (ref 97–108)
CHOLEST SERPL-MCNC: 164 MG/DL
CO2 SERPL-SCNC: 29 MMOL/L (ref 21–32)
CREAT SERPL-MCNC: 0.95 MG/DL (ref 0.7–1.3)
ERYTHROCYTE [DISTWIDTH] IN BLOOD BY AUTOMATED COUNT: 11.8 % (ref 11.5–14.5)
EST. AVERAGE GLUCOSE BLD GHB EST-MCNC: 103 MG/DL
GLOBULIN SER CALC-MCNC: 3.4 G/DL (ref 2–4)
GLUCOSE SERPL-MCNC: 87 MG/DL (ref 65–100)
HBA1C MFR BLD: 5.2 % (ref 4–5.6)
HCT VFR BLD AUTO: 41.3 % (ref 36.6–50.3)
HCV AB SERPL QL IA: NONREACTIVE
HDLC SERPL-MCNC: 60 MG/DL
HDLC SERPL: 2.7 {RATIO} (ref 0–5)
HGB BLD-MCNC: 14.2 G/DL (ref 12.1–17)
LDLC SERPL CALC-MCNC: 88.8 MG/DL (ref 0–100)
MCH RBC QN AUTO: 31.3 PG (ref 26–34)
MCHC RBC AUTO-ENTMCNC: 34.4 G/DL (ref 30–36.5)
MCV RBC AUTO: 91 FL (ref 80–99)
NRBC # BLD: 0 K/UL (ref 0–0.01)
NRBC BLD-RTO: 0 PER 100 WBC
PLATELET # BLD AUTO: 141 K/UL (ref 150–400)
PMV BLD AUTO: 12.3 FL (ref 8.9–12.9)
POTASSIUM SERPL-SCNC: 4.1 MMOL/L (ref 3.5–5.1)
PROT SERPL-MCNC: 7.6 G/DL (ref 6.4–8.2)
RBC # BLD AUTO: 4.54 M/UL (ref 4.1–5.7)
SODIUM SERPL-SCNC: 137 MMOL/L (ref 136–145)
TRIGL SERPL-MCNC: 76 MG/DL (ref ?–150)
TSH SERPL DL<=0.05 MIU/L-ACNC: 2.02 UIU/ML (ref 0.36–3.74)
VLDLC SERPL CALC-MCNC: 15.2 MG/DL
WBC # BLD AUTO: 9.4 K/UL (ref 4.1–11.1)

## 2021-11-23 RX ORDER — ERGOCALCIFEROL 1.25 MG/1
50000 CAPSULE ORAL
Qty: 8 CAPSULE | Refills: 0 | Status: SHIPPED | OUTPATIENT
Start: 2021-11-23 | End: 2021-11-23 | Stop reason: CLARIF

## 2021-11-23 RX ORDER — ERGOCALCIFEROL 1.25 MG/1
50000 CAPSULE ORAL
Qty: 8 CAPSULE | Refills: 0 | Status: SHIPPED | OUTPATIENT
Start: 2021-11-23

## 2021-11-23 NOTE — PROGRESS NOTES
Please call patient back about results  vit D is low--the patient needs 50,000 units weekly for 8 weeks then start a daily 2000unit supplement instead.    Mild low platelets send to hematology for further eval

## 2021-11-23 NOTE — PROGRESS NOTES
Called, spoke with Pt. Two pt identifiers confirmed. Pt informed of lab results and recommendations per Dr. Luis Shah. Pt agrees to take the once weekly Vit D then switch to 2000 units daily. Pt also asked for referral to Hematology to be mailed to him as well. Pt verbalized understanding of information discussed w/ no further questions at this time.

## 2021-12-08 ENCOUNTER — TELEPHONE (OUTPATIENT)
Dept: INTERNAL MEDICINE CLINIC | Age: 36
End: 2021-12-08

## 2021-12-08 NOTE — TELEPHONE ENCOUNTER
----- Message from Hurleyyarely Cook sent at 12/7/2021  5:03 PM EST -----  Subject: Message to Provider    QUESTIONS  Information for Provider? Patient called in to verify insurance due to   incorrect billing with Labs and regular check up with PCP Jeff Byrd; please call to confirm details with patient   ---------------------------------------------------------------------------  --------------  [address]  What is the best way for the office to contact you? OK to leave message on   voicemail  Preferred Call Back Phone Number?  [phone]  ---------------------------------------------------------------------------  --------------  SCRIPT ANSWERS  undefined

## 2021-12-08 NOTE — TELEPHONE ENCOUNTER
Called patient and verified patient's insurance carrier, plan, ID number and claim address that we have on file. Copy of insurance card scanned in on 11/16/21.

## 2021-12-28 NOTE — PROGRESS NOTES
Carolina Curiel is a 29 y.o. male here for new patient appt for thrombocytopenia. Referred by Dr Amrik Mendoza. Labs done 11/22/21  Plt 141  Pt states he platelets have been up and down over the past 3 years so his doctor wanted him to check it out. 1. Have you been to the ER, urgent care clinic since your last visit? Hospitalized since your last visit? New Pt    2. Have you seen or consulted any other health care providers outside of the [address] [place] [address] since your last visit? Include any pap smears or colon screening.  New Pt

## 2021-12-30 ENCOUNTER — OFFICE VISIT (OUTPATIENT)
Dept: ONCOLOGY | Age: 36
End: 2021-12-30
Payer: COMMERCIAL

## 2021-12-30 VITALS
HEIGHT: 68 IN | BODY MASS INDEX: 31.71 KG/M2 | TEMPERATURE: 98.5 F | WEIGHT: 209.2 LBS | HEART RATE: 76 BPM | SYSTOLIC BLOOD PRESSURE: 127 MMHG | DIASTOLIC BLOOD PRESSURE: 73 MMHG | OXYGEN SATURATION: 96 %

## 2021-12-30 DIAGNOSIS — D69.6 THROMBOCYTOPENIA (HCC): Primary | ICD-10-CM

## 2021-12-30 PROCEDURE — 99203 OFFICE O/P NEW LOW 30 MIN: CPT | Performed by: INTERNAL MEDICINE

## 2021-12-30 NOTE — PROGRESS NOTES
[address] [facility] [address], [address] [facility], [address] [facility], [address] [place] [address]  [phone]        Hematology Note        Patient: Rachel Ramey MRN: [id number]  SSN: [id number]    YOB: 1985  Age: 29 y.o. Sex: male        Subjective:      Rachel Ramey is a 29 y.o. male who I am seeing in consultation per the request of Dr. Valerie Nevarez for mild thrombocytopenia. It has been present for over 3 yrs. He underwent a routine physical including laboratory which showed mild thrombocytopenia. He denies bruising or bleeding. Review of Systems:    Constitutional: negative  Eyes: negative  Ears, Nose, Mouth, Throat, and Face: negative  Respiratory: negative  Cardiovascular: negative  Gastrointestinal: negative  Genitourinary:negative  Integument/Breast: negative  Hematologic/Lymphatic: negative  Musculoskeletal:negative  Neurological: negative      History reviewed. No pertinent past medical history. Past Surgical History:   Procedure Laterality Date    HX HEENT      wisdom teeth      Family History   Problem Relation Age of Onset    Cancer Father      Social History     Tobacco Use    Smoking status: Never Smoker    Smokeless tobacco: Never Used   Substance Use Topics    Alcohol use: Yes     Comment: once weekly       Prior to Admission medications    Medication Sig Start Date End Date Taking? Authorizing Provider   ergocalciferol (ERGOCALCIFEROL) 1,250 mcg (50,000 unit) capsule Take 1 Capsule by mouth every seven (7) days. 11/23/21  Yes Cesar West MD   cetirizine (ZYRTEC) 10 mg tablet Take 10 mg by mouth daily.    Yes Provider, Historical              No Known Allergies        Objective:     Vitals:    12/30/21 1536   BP: 127/73   Pulse: 76   Temp: 98.5 °F (36.9 °C)   TempSrc: Temporal   SpO2: 96%   Weight: 209 lb 3.2 oz (94.9 kg)   Height: 5' 8\" (1.727 m)            Physical Exam:    GENERAL: alert, cooperative, no distress, appears stated age  EYE: conjunctivae/corneas clear  LYMPHATIC: Cervical, supraclavicular, and axillary nodes normal.   THROAT & NECK: normal and no erythema or exudates noted. LUNG: clear to auscultation bilaterally  HEART: regular rate and rhythm, S1, S2 normal, no murmur, click, rub or gallop  ABDOMEN: soft, non-tender  EXTREMITIES:  extremities normal, atraumatic,  SKIN: Normal.  NEUROLOGIC: AOx3. Gait normal. Reflexes and motor strength normal and symmetric. Cranial nerves 2-12 and sensation grossly intact. Lab Results   Component Value Date/Time    WBC 9.4 11/22/2021 11:36 AM    HGB 14.2 11/22/2021 11:36 AM    HCT 41.3 11/22/2021 11:36 AM    PLATELET 612 (L) 50/70/9642 11:36 AM    MCV 91.0 11/22/2021 11:36 AM              Assessment:     1. Mild thrombocytopenia:    The patient's hemoglobin, white blood cell and differential blood counts are normal.   Benign  Asymptomatic      Plan:       > No other active intervention is needed. Signed by: Carlos Minor MD                     December 30, 2021       CC.  Daniel London MD

## 2021-12-30 NOTE — LETTER
12/30/2021    Patient: Doug Velasco   YOB: 1985   Date of Visit: 12/30/2021     Keenan Mensah, [address]  [address] [facility] [address]  [address] [address]  [place]    Dear eKenan Mensah MD,      Thank you for referring Mr. Doug Velasco to [address] [place] [address] for evaluation. My notes for this consultation are attached. If you have questions, please do not hesitate to call me. I look forward to following your patient along with you.       Sincerely,    Claudetta Harrier, MD

## 2022-06-06 ENCOUNTER — VIRTUAL VISIT (OUTPATIENT)
Dept: INTERNAL MEDICINE CLINIC | Age: 37
End: 2022-06-06
Payer: COMMERCIAL

## 2022-06-06 ENCOUNTER — TELEPHONE (OUTPATIENT)
Dept: INTERNAL MEDICINE CLINIC | Age: 37
End: 2022-06-06

## 2022-06-06 DIAGNOSIS — U07.1 COVID-19: Primary | ICD-10-CM

## 2022-06-06 DIAGNOSIS — E66.9 OBESITY (BMI 30.0-34.9): ICD-10-CM

## 2022-06-06 PROCEDURE — 99213 OFFICE O/P EST LOW 20 MIN: CPT | Performed by: INTERNAL MEDICINE

## 2022-06-06 RX ORDER — NIRMATRELVIR AND RITONAVIR 300-100 MG
3 KIT ORAL EVERY 12 HOURS
Qty: 1 BOX | Refills: 0 | Status: SHIPPED | OUTPATIENT
Start: 2022-06-06

## 2022-06-06 NOTE — PROGRESS NOTES
HISTORY OF PRESENT ILLNESS  Pattie Serrano is a 29 y.o. male. HPI     Last here 11/16/21. Pt is here for acute care. This is an established visit completed with telemedicine was completed with video assist  the patient acknowledges and agrees to this method of visitation doxyme    He tested positive for covid  His 3year old got it first, 4 month old might have it as well did not test but had fever  Yesterday he started to have fever, chills, had high fever of 103 degrees  Has been taking acetaminophen and ibuprofen  He went to [facility], tested positive for covid, was told he was not eligible for paxlovid  Discussed that this is wrong, he qualifies for paxlovid based on weight  He has not had any of his covid shots  Will order paxlovid, discussed that this could cause a metallic taste and potential for rebound  Discussed that it is ok to continue acetaminophen and ibuprofen, and if breathing starts to become a problem that is a reason to go to the ED      Wt last visit was 209 lbs, stable x lov per pt  He is running, starting marathon training    There are no problems to display for this patient. Current Outpatient Medications   Medication Sig Dispense Refill    nirmatrelvir-ritonavir (Paxlovid, EUA,) 150 mg x 2- 100 mg tablet Take 3 Tablets by mouth every twelve (12) hours. 60< GFR 1 Box 0    ergocalciferol (ERGOCALCIFEROL) 1,250 mcg (50,000 unit) capsule Take 1 Capsule by mouth every seven (7) days. 8 Capsule 0    cetirizine (ZYRTEC) 10 mg tablet Take 10 mg by mouth daily.        Past Surgical History:   Procedure Laterality Date    HX HEENT      wisdom teeth      Lab Results   Component Value Date/Time    WBC 9.4 11/22/2021 11:36 AM    HGB 14.2 11/22/2021 11:36 AM    HCT 41.3 11/22/2021 11:36 AM    PLATELET 549 (L) 52/98/1299 11:36 AM    MCV 91.0 11/22/2021 11:36 AM     Lab Results   Component Value Date/Time    Cholesterol, total 164 11/22/2021 11:36 AM    HDL Cholesterol 60 11/22/2021 11:36 AM    LDL, calculated 88.8 11/22/2021 11:36 AM    Triglyceride 76 11/22/2021 11:36 AM    CHOL/HDL Ratio 2.7 11/22/2021 11:36 AM     Lab Results   Component Value Date/Time    GFR est non-AA >60 11/22/2021 11:36 AM    GFR est AA >60 11/22/2021 11:36 AM    Creatinine 0.95 11/22/2021 11:36 AM    BUN 16 11/22/2021 11:36 AM    Sodium 137 11/22/2021 11:36 AM    Potassium 4.1 11/22/2021 11:36 AM    Chloride 102 11/22/2021 11:36 AM    CO2 29 11/22/2021 11:36 AM        Review of Systems   Constitutional: Positive for chills and fever. Respiratory: Negative for shortness of breath. Cardiovascular: Negative for chest pain. Physical Exam  Constitutional:       General: He is not in acute distress. Appearance: Normal appearance. He is not ill-appearing, toxic-appearing or diaphoretic. HENT:      Head: Normocephalic and atraumatic. Eyes:      General:         Right eye: No discharge. Left eye: No discharge. Conjunctiva/sclera: Conjunctivae normal.   Pulmonary:      Effort: No respiratory distress. Neurological:      Mental Status: He is alert and oriented to person, place, and time. Mental status is at baseline. Gait: Gait normal.   Psychiatric:         Mood and Affect: Mood normal.         Behavior: Behavior normal.         ASSESSMENT and PLAN    ICD-10-CM ICD-9-CM    1. COVID-19       Patient diagnosed with COVID    Discussed treatment options    He is not vaccinated    He is stable    Discussed respiratory symptoms to look out for things to warrant going to the emergency department such as difficulty breathing low oxygen    He meets criteria for paxlovid due to obesity    Reviewed last labs GFR is greater than 60 he does not have any medications that interact with this have ordered the medication to the pharmacy    Discussed contact precautions     U07.1 079.89    2. Obesity (BMI 30.0-34. 9)     Work on weight loss E66.9 278.00         Depression screen reviewed and negative     Scribed by Tonya Vilchis Dl Mirza, as dictated by Dr. Nolberto Mckeon. Current diagnosis and concerns discussed with pt at length. Pt understands risks and benefits or current treatment plan and medications, and accepts the treatment and medication with any possible risks. Pt asks appropriate questions, which were answered. Pt was instructed to call with any concerns or problems. I have reviewed the note documented by the scribe. The services provided are my own. The documentation is accurate     Evelyn Sosa, was evaluated through a synchronous (real-time) audio-video encounter. The patient (or guardian if applicable) is aware that this is a billable service, which includes applicable co-pays. This Virtual Visit was conducted with patient's (and/or legal guardian's) consent. The visit was conducted pursuant to the emergency declaration under the [facility], [address] [place] [address] [facility] authority and the [employer] and [employer] General Act. Patient identification was verified, and a caregiver was present when appropriate. The patient was located at: Home: [facility]  The provider was located at:  Facility (Appt Department): [address] [place] [address]

## 2022-06-06 NOTE — TELEPHONE ENCOUNTER
Patient needs a call back Positive for COVID to discuss Plan of care & any medications that could possibly be prescribed for symptoms. Please call.  Thank you

## 2022-06-06 NOTE — TELEPHONE ENCOUNTER
Called, spoke to pt. Two pt identifiers confirmed. Patient states he needs appointment for Covid  Patient offered and accepted VV appointment 06/06/2022 at 10:15    Pt verbalized understanding of information discussed w/ no further questions at this time.

## 2023-04-19 ENCOUNTER — OFFICE VISIT (OUTPATIENT)
Dept: INTERNAL MEDICINE CLINIC | Age: 38
End: 2023-04-19
Payer: COMMERCIAL

## 2023-04-19 VITALS
TEMPERATURE: 98.4 F | RESPIRATION RATE: 16 BRPM | HEART RATE: 81 BPM | OXYGEN SATURATION: 97 % | DIASTOLIC BLOOD PRESSURE: 77 MMHG | WEIGHT: 219.6 LBS | HEIGHT: 68 IN | BODY MASS INDEX: 33.28 KG/M2 | SYSTOLIC BLOOD PRESSURE: 122 MMHG

## 2023-04-19 DIAGNOSIS — Z00.00 PHYSICAL EXAM: Primary | ICD-10-CM

## 2023-04-19 DIAGNOSIS — R21 RASH: ICD-10-CM

## 2023-04-19 PROCEDURE — 99395 PREV VISIT EST AGE 18-39: CPT | Performed by: INTERNAL MEDICINE

## 2023-04-19 RX ORDER — CEPHALEXIN 500 MG/1
500 CAPSULE ORAL 4 TIMES DAILY
Qty: 40 CAPSULE | Refills: 0 | Status: SHIPPED | OUTPATIENT
Start: 2023-04-19 | End: 2023-04-29

## 2023-04-19 RX ORDER — VALACYCLOVIR HYDROCHLORIDE 1 G/1
1000 TABLET, FILM COATED ORAL 3 TIMES DAILY
Qty: 21 TABLET | Refills: 0 | Status: SHIPPED | OUTPATIENT
Start: 2023-04-19 | End: 2023-04-26

## 2023-04-19 NOTE — PROGRESS NOTES
HISTORY OF PRESENT ILLNESS  Kodi Bazzi is a 40 y.o. male. HPI    Last here vv 6/6/23. Here for acute/routine care. Pt reports sharp pain in his neck and a bump on his head x 4 days as well as bumps on his neck, and behind his ear x 1 day. States the bumps on his neck and head are painful/sensitive. He thinks the bump on his head has grown since onset. Denies any known recent scratches of his head, though he is a  and does a fair amount of grinding. Usually wears some kind of cap on at work. On exam: Papules on scalp, blistering, 1.5 inch diameter   3 reactive lymph nodes on R side of neck and behind R ear     Discussed bumps on his neck are reactive lymph nodes due to likely infectious rash. Rx'd keflex and valtrex. Advised pt to have his wife check, and if rash and lymph nodes grow significantly to call back sooner. BP today is 122/77     Wt today is 219 lbs, up 8 lbs since lov   He is working with a therapist on emotional eating, working on intermittent fasting, trying to avoid sugars     Reviewed labs    Ordered labs, of note, he ate today so will come back for fasting labs     Platelets were previously low   He saw Dr. Ashley Dickey (onc) for this 12/30/21  Reviewed note:  Assessment:    1. Mild thrombocytopenia:     The patient's hemoglobin, white blood cell and differential blood counts are normal.   Benign  Asymptomatic  Plan:    > No other active intervention is needed.     Does not need to f/U     Continues zyrtec OTC 10mg prn for allergies, which works well    Was taking vit D but stopped after he ran out       PREVENTIVE:  Colonoscopy: not yet needed, denies FMHx colon cancer   PSA: not yet needed  AAA screen: not yet needed  Tdap:05/24/18   Pneumovax: not yet needed  Shingrix: not yet needed  Flu shot: declines   Eye exam: 8/22, q annually   Lipids: 11/21 LDL 88   A1C: 11/21 5.2   Hep B: complete 11/16/21  Hep C: 11/21 neg  Covid: declines      There is no problem list on file for this patient. Current Outpatient Medications   Medication Sig Dispense Refill    nirmatrelvir-ritonavir (Paxlovid, EUA,) 150 mg x 2- 100 mg tablet Take 3 Tablets by mouth every twelve (12) hours. 60< GFR 1 Box 0    ergocalciferol (ERGOCALCIFEROL) 1,250 mcg (50,000 unit) capsule Take 1 Capsule by mouth every seven (7) days. 8 Capsule 0    cetirizine (ZYRTEC) 10 mg tablet Take 10 mg by mouth daily. Past Surgical History:   Procedure Laterality Date    HX HEENT      wisdom teeth         Lab Results   Component Value Date    WBC 9.4 11/22/2021    HGB 14.2 11/22/2021    HCT 41.3 11/22/2021    MCV 91.0 11/22/2021     (L) 11/22/2021     Lab Results   Component Value Date/Time    Cholesterol, total 164 11/22/2021 11:36 AM    HDL Cholesterol 60 11/22/2021 11:36 AM    LDL, calculated 88.8 11/22/2021 11:36 AM    VLDL, calculated 15.2 11/22/2021 11:36 AM    Triglyceride 76 11/22/2021 11:36 AM    CHOL/HDL Ratio 2.7 11/22/2021 11:36 AM     Lab Results   Component Value Date/Time    GFR est AA >60 11/22/2021 11:36 AM    GFR est non-AA >60 11/22/2021 11:36 AM    Creatinine 0.95 11/22/2021 11:36 AM    BUN 16 11/22/2021 11:36 AM    Sodium 137 11/22/2021 11:36 AM    Potassium 4.1 11/22/2021 11:36 AM    Chloride 102 11/22/2021 11:36 AM    CO2 29 11/22/2021 11:36 AM         Review of Systems   Respiratory:  Negative for shortness of breath. Cardiovascular:  Negative for chest pain. Skin:  Positive for rash. Physical Exam  Constitutional:       General: He is not in acute distress. Appearance: Normal appearance. He is not ill-appearing, toxic-appearing or diaphoretic. HENT:      Head: Normocephalic and atraumatic. Right Ear: External ear normal.      Left Ear: External ear normal.   Eyes:      General:         Right eye: No discharge. Left eye: No discharge. Conjunctiva/sclera: Conjunctivae normal.      Pupils: Pupils are equal, round, and reactive to light.    Neck:      Vascular: No carotid bruit. Comments: 3 reactive lymph nodes on R side of neck and behind R ear   Cardiovascular:      Rate and Rhythm: Normal rate and regular rhythm. Heart sounds: No murmur heard. No friction rub. No gallop. Pulmonary:      Effort: No respiratory distress. Breath sounds: Normal breath sounds. No wheezing or rales. Chest:      Chest wall: No tenderness. Musculoskeletal:         General: Normal range of motion. Cervical back: Normal range of motion. Skin:     General: Skin is warm and dry. Comments: Papules on scalp, blistering, 1.5 inch diameter    Neurological:      General: No focal deficit present. Mental Status: He is oriented to person, place, and time. Gait: Gait normal.   Psychiatric:         Mood and Affect: Mood normal.         Behavior: Behavior normal.         ASSESSMENT and PLAN    ICD-10-CM ICD-9-CM    1. Physical exam  Z00.00 V70.9 LIPID PANEL      METABOLIC PANEL, COMPREHENSIVE   Eye exam up-to-date    Labs overdue ordered    Declines flu shot and COVID-vaccine    Blood pressure controlled    Weight unfortunately has climbed he denies any back on track with this    We will contact you for fasting labs   HEMOGLOBIN A1C WITH EAG      TSH 3RD GENERATION      CBC W/O DIFF      VITAMIN D, 25 HYDROXY      2. Rash  R21 782. 1    Patient with infectious papules possible impetigo? Perhaps atypical shingles? Has adenopathy stemming from this rash--we will treat for cellulitis/impetigo with Keflex no sign of abscess will also cover for possible shingles without tracts we will have him return to clinic next week to ensure that he is improving    Discussed warning signs to look out for: increasing adenopathy fevers increasing erythema       Depression screen reviewed and negative. Scribed by Yelena Monaco, as dictated by Dr. Jayden Meredith. Current diagnosis and concerns discussed with pt at length.  Pt understands risks and benefits or current treatment plan and medications, and accepts the treatment and medication with any possible risks. Pt asks appropriate questions, which were answered. Pt was instructed to call with any concerns or problems. I have reviewed the note documented by the scribe. The services provided are my own. The documentation is accurate.

## 2023-04-21 ENCOUNTER — LAB ONLY (OUTPATIENT)
Dept: INTERNAL MEDICINE CLINIC | Age: 38
End: 2023-04-21

## 2023-04-21 DIAGNOSIS — Z00.00 PHYSICAL EXAM: ICD-10-CM

## 2023-04-21 LAB
25(OH)D3 SERPL-MCNC: 25.7 NG/ML (ref 30–100)
ALBUMIN SERPL-MCNC: 4.3 G/DL (ref 3.5–5)
ALBUMIN/GLOB SERPL: 1.2 (ref 1.1–2.2)
ALP SERPL-CCNC: 46 U/L (ref 45–117)
ALT SERPL-CCNC: 39 U/L (ref 12–78)
ANION GAP SERPL CALC-SCNC: 5 MMOL/L (ref 5–15)
AST SERPL-CCNC: 28 U/L (ref 15–37)
BILIRUB SERPL-MCNC: 0.6 MG/DL (ref 0.2–1)
BUN SERPL-MCNC: 14 MG/DL (ref 6–20)
BUN/CREAT SERPL: 14 (ref 12–20)
CALCIUM SERPL-MCNC: 8.9 MG/DL (ref 8.5–10.1)
CHLORIDE SERPL-SCNC: 104 MMOL/L (ref 97–108)
CHOLEST SERPL-MCNC: 151 MG/DL
CO2 SERPL-SCNC: 26 MMOL/L (ref 21–32)
CREAT SERPL-MCNC: 0.97 MG/DL (ref 0.7–1.3)
ERYTHROCYTE [DISTWIDTH] IN BLOOD BY AUTOMATED COUNT: 11.5 % (ref 11.5–14.5)
EST. AVERAGE GLUCOSE BLD GHB EST-MCNC: 103 MG/DL
GLOBULIN SER CALC-MCNC: 3.6 G/DL (ref 2–4)
GLUCOSE SERPL-MCNC: 93 MG/DL (ref 65–100)
HBA1C MFR BLD: 5.2 % (ref 4–5.6)
HCT VFR BLD AUTO: 39.2 % (ref 36.6–50.3)
HDLC SERPL-MCNC: 61 MG/DL
HDLC SERPL: 2.5 (ref 0–5)
HGB BLD-MCNC: 13.5 G/DL (ref 12.1–17)
LDLC SERPL CALC-MCNC: 80.2 MG/DL (ref 0–100)
MCH RBC QN AUTO: 31 PG (ref 26–34)
MCHC RBC AUTO-ENTMCNC: 34.4 G/DL (ref 30–36.5)
MCV RBC AUTO: 89.9 FL (ref 80–99)
NRBC # BLD: 0 K/UL (ref 0–0.01)
NRBC BLD-RTO: 0 PER 100 WBC
PLATELET # BLD AUTO: 132 K/UL (ref 150–400)
PMV BLD AUTO: 11.9 FL (ref 8.9–12.9)
POTASSIUM SERPL-SCNC: 3.8 MMOL/L (ref 3.5–5.1)
PROT SERPL-MCNC: 7.9 G/DL (ref 6.4–8.2)
RBC # BLD AUTO: 4.36 M/UL (ref 4.1–5.7)
SODIUM SERPL-SCNC: 135 MMOL/L (ref 136–145)
TRIGL SERPL-MCNC: 49 MG/DL (ref ?–150)
TSH SERPL DL<=0.05 MIU/L-ACNC: 1.7 UIU/ML (ref 0.36–3.74)
VLDLC SERPL CALC-MCNC: 9.8 MG/DL
WBC # BLD AUTO: 4.5 K/UL (ref 4.1–11.1)

## 2023-04-25 ENCOUNTER — OFFICE VISIT (OUTPATIENT)
Dept: INTERNAL MEDICINE CLINIC | Age: 38
End: 2023-04-25

## 2023-04-25 VITALS
RESPIRATION RATE: 16 BRPM | HEIGHT: 68 IN | SYSTOLIC BLOOD PRESSURE: 113 MMHG | HEART RATE: 78 BPM | OXYGEN SATURATION: 100 % | DIASTOLIC BLOOD PRESSURE: 77 MMHG | BODY MASS INDEX: 33.01 KG/M2 | WEIGHT: 217.8 LBS | TEMPERATURE: 99 F

## 2023-04-25 DIAGNOSIS — H91.93 DECREASED HEARING OF BOTH EARS: ICD-10-CM

## 2023-04-25 DIAGNOSIS — D69.6 THROMBOCYTOPENIA (HCC): ICD-10-CM

## 2023-04-25 DIAGNOSIS — E55.9 VITAMIN D DEFICIENCY: ICD-10-CM

## 2023-04-25 DIAGNOSIS — R21 RASH: Primary | ICD-10-CM

## 2023-04-25 DIAGNOSIS — E66.9 OBESITY (BMI 30.0-34.9): ICD-10-CM

## 2023-06-05 ASSESSMENT — ENCOUNTER SYMPTOMS: SHORTNESS OF BREATH: 0

## 2023-06-06 ENCOUNTER — OFFICE VISIT (OUTPATIENT)
Age: 38
End: 2023-06-06
Payer: COMMERCIAL

## 2023-06-06 VITALS
SYSTOLIC BLOOD PRESSURE: 117 MMHG | HEIGHT: 68 IN | HEART RATE: 63 BPM | TEMPERATURE: 98.8 F | DIASTOLIC BLOOD PRESSURE: 76 MMHG | WEIGHT: 196 LBS | OXYGEN SATURATION: 98 % | RESPIRATION RATE: 16 BRPM | BODY MASS INDEX: 29.7 KG/M2

## 2023-06-06 DIAGNOSIS — F51.01 PRIMARY INSOMNIA: ICD-10-CM

## 2023-06-06 DIAGNOSIS — E66.9 OBESITY (BMI 30.0-34.9): ICD-10-CM

## 2023-06-06 DIAGNOSIS — R21 RASH: ICD-10-CM

## 2023-06-06 DIAGNOSIS — L42 PITYRIASIS ROSEA: Primary | ICD-10-CM

## 2023-06-06 PROCEDURE — 99213 OFFICE O/P EST LOW 20 MIN: CPT | Performed by: INTERNAL MEDICINE

## 2023-06-06 RX ORDER — TRIAMCINOLONE ACETONIDE 1 MG/G
CREAM TOPICAL
Qty: 45 G | Refills: 0 | Status: SHIPPED | OUTPATIENT
Start: 2023-06-06

## 2023-06-06 SDOH — ECONOMIC STABILITY: HOUSING INSECURITY
IN THE LAST 12 MONTHS, WAS THERE A TIME WHEN YOU DID NOT HAVE A STEADY PLACE TO SLEEP OR SLEPT IN A SHELTER (INCLUDING NOW)?: NO

## 2023-06-06 SDOH — ECONOMIC STABILITY: FOOD INSECURITY: WITHIN THE PAST 12 MONTHS, YOU WORRIED THAT YOUR FOOD WOULD RUN OUT BEFORE YOU GOT MONEY TO BUY MORE.: NEVER TRUE

## 2023-06-06 SDOH — ECONOMIC STABILITY: FOOD INSECURITY: WITHIN THE PAST 12 MONTHS, THE FOOD YOU BOUGHT JUST DIDN'T LAST AND YOU DIDN'T HAVE MONEY TO GET MORE.: NEVER TRUE

## 2023-06-06 SDOH — ECONOMIC STABILITY: INCOME INSECURITY: HOW HARD IS IT FOR YOU TO PAY FOR THE VERY BASICS LIKE FOOD, HOUSING, MEDICAL CARE, AND HEATING?: NOT HARD AT ALL

## 2023-06-06 ASSESSMENT — PATIENT HEALTH QUESTIONNAIRE - PHQ9
SUM OF ALL RESPONSES TO PHQ9 QUESTIONS 1 & 2: 0
SUM OF ALL RESPONSES TO PHQ QUESTIONS 1-9: 0
SUM OF ALL RESPONSES TO PHQ QUESTIONS 1-9: 0
1. LITTLE INTEREST OR PLEASURE IN DOING THINGS: 0
2. FEELING DOWN, DEPRESSED OR HOPELESS: 0
SUM OF ALL RESPONSES TO PHQ QUESTIONS 1-9: 0
SUM OF ALL RESPONSES TO PHQ QUESTIONS 1-9: 0

## 2023-06-06 NOTE — PROGRESS NOTES
1. \"Have you been to the ER, urgent care clinic since your last visit? Hospitalized since your last visit? \" no    2. \"Have you seen or consulted any other health care providers outside of the [address] [place] [address] since your last visit? \" no     3. For patients aged 39-70: Has the patient had a colonoscopy / FIT/ Cologuard?  N/A
ergocalciferol (ERGOCALCIFEROL) 1.25 MG (96924 UT) capsule Take 50,000 Units by mouth every 7 days       No current facility-administered medications for this visit. Past Surgical History:   Procedure Laterality Date    HEENT      wisdom teeth         Lab Results   Component Value Date    WBC 4.5 04/21/2023    HGB 13.5 04/21/2023    HCT 39.2 04/21/2023    MCV 89.9 04/21/2023     (L) 04/21/2023     Lab Results   Component Value Date    CHOL 151 04/21/2023    TRIG 49 04/21/2023    HDL 61 04/21/2023    LDLCALC 80.2 04/21/2023     Lab Results   Component Value Date    CREATININE 0.97 04/21/2023    BUN 14 04/21/2023     (L) 04/21/2023    K 3.8 04/21/2023     04/21/2023    CO2 26 04/21/2023       Review of Systems   Respiratory:  Negative for shortness of breath. Cardiovascular:  Negative for chest pain. Skin:  Positive for rash. Physical Exam  Constitutional:       General: He is not in acute distress. Appearance: He is not ill-appearing, toxic-appearing or diaphoretic. HENT:      Head: Normocephalic and atraumatic. Eyes:      General:         Right eye: No discharge. Left eye: No discharge. Extraocular Movements: Extraocular movements intact. Cardiovascular:      Rate and Rhythm: Normal rate and regular rhythm. Heart sounds: No murmur heard. Pulmonary:      Effort: Pulmonary effort is normal. No respiratory distress. Breath sounds: Normal breath sounds. Musculoskeletal:         General: No swelling, tenderness or deformity. Cervical back: Normal range of motion and neck supple. No tenderness. Lymphadenopathy:      Cervical: No cervical adenopathy. Skin:     General: Skin is warm and dry. Findings: No erythema. Comments: Pityriasis rosea   Neurological:      General: No focal deficit present. Mental Status: He is alert and oriented to person, place, and time. Mental status is at baseline.       Gait: Gait normal.

## 2024-02-05 ENCOUNTER — TELEPHONE (OUTPATIENT)
Age: 39
End: 2024-02-05

## 2024-02-05 DIAGNOSIS — Z00.00 ENCOUNTER FOR GENERAL ADULT MEDICAL EXAMINATION WITHOUT ABNORMAL FINDINGS: ICD-10-CM

## 2024-02-05 DIAGNOSIS — E55.9 VITAMIN D DEFICIENCY, UNSPECIFIED: Primary | ICD-10-CM

## 2024-02-05 DIAGNOSIS — D69.6 THROMBOCYTOPENIA (HCC): ICD-10-CM

## 2024-02-05 NOTE — TELEPHONE ENCOUNTER
States seeing specialist for adhd  NP Марина Rodrigues  Prescribed welbutrin for 1 month trial  Advised if doesn't work, pt will need blood panel done before other med can be prescribed.  Pt states welbutrin not working, thus requesting pcp to order blood work.  Thinks will need vit d (as was low on that previously) but not sure what else.      Call pt to discuss and advise  [phone]

## 2024-02-07 NOTE — TELEPHONE ENCOUNTER
Called, Spoke with Pt  Received two pt identifiers  VORB per Dr. Patino labs and Vit D lab ordered as well  Pt verbalizes understanding of the instructions and has no further questions at this time.

## 2024-02-09 ENCOUNTER — NURSE ONLY (OUTPATIENT)
Age: 39
End: 2024-02-09

## 2024-02-09 DIAGNOSIS — Z00.00 ENCOUNTER FOR GENERAL ADULT MEDICAL EXAMINATION WITHOUT ABNORMAL FINDINGS: ICD-10-CM

## 2024-02-09 DIAGNOSIS — D69.6 THROMBOCYTOPENIA (HCC): ICD-10-CM

## 2024-02-09 DIAGNOSIS — E55.9 VITAMIN D DEFICIENCY, UNSPECIFIED: ICD-10-CM

## 2024-02-10 LAB
25(OH)D3 SERPL-MCNC: 27.1 NG/ML (ref 30–100)
ALBUMIN SERPL-MCNC: 4.4 G/DL (ref 3.5–5)
ALBUMIN/GLOB SERPL: 1.2 (ref 1.1–2.2)
ALP SERPL-CCNC: 47 U/L (ref 45–117)
ALT SERPL-CCNC: 27 U/L (ref 12–78)
ANION GAP SERPL CALC-SCNC: 7 MMOL/L (ref 5–15)
AST SERPL-CCNC: 16 U/L (ref 15–37)
BILIRUB SERPL-MCNC: 0.4 MG/DL (ref 0.2–1)
BUN SERPL-MCNC: 18 MG/DL (ref 6–20)
BUN/CREAT SERPL: 17 (ref 12–20)
CALCIUM SERPL-MCNC: 9.3 MG/DL (ref 8.5–10.1)
CHLORIDE SERPL-SCNC: 105 MMOL/L (ref 97–108)
CHOLEST SERPL-MCNC: 185 MG/DL
CO2 SERPL-SCNC: 28 MMOL/L (ref 21–32)
CREAT SERPL-MCNC: 1.06 MG/DL (ref 0.7–1.3)
ERYTHROCYTE [DISTWIDTH] IN BLOOD BY AUTOMATED COUNT: 11.4 % (ref 11.5–14.5)
EST. AVERAGE GLUCOSE BLD GHB EST-MCNC: 97 MG/DL
GLOBULIN SER CALC-MCNC: 3.7 G/DL (ref 2–4)
GLUCOSE SERPL-MCNC: 78 MG/DL (ref 65–100)
HBA1C MFR BLD: 5 % (ref 4–5.6)
HCT VFR BLD AUTO: 44.1 % (ref 36.6–50.3)
HDLC SERPL-MCNC: 69 MG/DL
HDLC SERPL: 2.7 (ref 0–5)
HGB BLD-MCNC: 14.9 G/DL (ref 12.1–17)
LDLC SERPL CALC-MCNC: 97.6 MG/DL (ref 0–100)
MCH RBC QN AUTO: 31.1 PG (ref 26–34)
MCHC RBC AUTO-ENTMCNC: 33.8 G/DL (ref 30–36.5)
MCV RBC AUTO: 92.1 FL (ref 80–99)
NRBC # BLD: 0 K/UL (ref 0–0.01)
NRBC BLD-RTO: 0 PER 100 WBC
PLATELET # BLD AUTO: 152 K/UL (ref 150–400)
PMV BLD AUTO: 12.6 FL (ref 8.9–12.9)
POTASSIUM SERPL-SCNC: 3.8 MMOL/L (ref 3.5–5.1)
PROT SERPL-MCNC: 8.1 G/DL (ref 6.4–8.2)
RBC # BLD AUTO: 4.79 M/UL (ref 4.1–5.7)
SODIUM SERPL-SCNC: 140 MMOL/L (ref 136–145)
TRIGL SERPL-MCNC: 92 MG/DL
TSH SERPL DL<=0.05 MIU/L-ACNC: 1.74 UIU/ML (ref 0.36–3.74)
VLDLC SERPL CALC-MCNC: 18.4 MG/DL
WBC # BLD AUTO: 8.7 K/UL (ref 4.1–11.1)

## 2024-04-17 ENCOUNTER — TELEMEDICINE (OUTPATIENT)
Age: 39
End: 2024-04-17
Payer: COMMERCIAL

## 2024-04-17 ENCOUNTER — TELEPHONE (OUTPATIENT)
Age: 39
End: 2024-04-17

## 2024-04-17 VITALS
BODY MASS INDEX: 31.8 KG/M2 | DIASTOLIC BLOOD PRESSURE: 68 MMHG | TEMPERATURE: 99.5 F | RESPIRATION RATE: 20 BRPM | HEIGHT: 68 IN | SYSTOLIC BLOOD PRESSURE: 104 MMHG | OXYGEN SATURATION: 99 % | WEIGHT: 209.8 LBS | HEART RATE: 107 BPM

## 2024-04-17 DIAGNOSIS — J06.9 UPPER RESPIRATORY TRACT INFECTION, UNSPECIFIED TYPE: Primary | ICD-10-CM

## 2024-04-17 PROCEDURE — 99213 OFFICE O/P EST LOW 20 MIN: CPT | Performed by: INTERNAL MEDICINE

## 2024-04-17 RX ORDER — BENZONATATE 100 MG/1
100 CAPSULE ORAL 3 TIMES DAILY PRN
Qty: 20 CAPSULE | Refills: 0 | Status: SHIPPED | OUTPATIENT
Start: 2024-04-17 | End: 2024-04-24

## 2024-04-17 ASSESSMENT — PATIENT HEALTH QUESTIONNAIRE - PHQ9
SUM OF ALL RESPONSES TO PHQ QUESTIONS 1-9: 0
SUM OF ALL RESPONSES TO PHQ9 QUESTIONS 1 & 2: 0
SUM OF ALL RESPONSES TO PHQ QUESTIONS 1-9: 0
SUM OF ALL RESPONSES TO PHQ QUESTIONS 1-9: 0
2. FEELING DOWN, DEPRESSED OR HOPELESS: NOT AT ALL
SUM OF ALL RESPONSES TO PHQ QUESTIONS 1-9: 0
1. LITTLE INTEREST OR PLEASURE IN DOING THINGS: NOT AT ALL

## 2024-04-17 ASSESSMENT — ENCOUNTER SYMPTOMS: SHORTNESS OF BREATH: 0

## 2024-04-17 NOTE — PROGRESS NOTES
HISTORY OF PRESENT ILLNESS  bS Womack is a 38 y.o. male.  URI   Pertinent negatives include no chest pain.       This is an established visit completed with telemedicine was completed with video assist. The patient acknowledges and agrees to this method of visitation.    He presents for acute care.He presents in the office today after tested covid negative.    He has hx of UR sx x Sunday  He reports dry cough, sneeze,body ache and chills, sore throat  He states he had fever(100) since Monday  Denies ear, sinus pain  He is taking michael seltzer   Tested covid negative today  Had covid 2022- couldn't tolerate paxlovid      Has sick contacts- wife and baby are sick    Flu test negative today    Patient Active Problem List   Diagnosis    Thrombocytopenia (HCC)     Current Outpatient Medications   Medication Sig Dispense Refill    ergocalciferol (ERGOCALCIFEROL) 1.25 MG (19389 UT) capsule Take 1 capsule by mouth every 7 days      triamcinolone (KENALOG) 0.1 % cream Apply topically 2 times daily. (Patient not taking: Reported on 4/17/2024) 45 g 0    cetirizine (ZYRTEC) 10 MG tablet Take 1 tablet by mouth daily (Patient not taking: Reported on 4/17/2024)       No current facility-administered medications for this visit.     Past Surgical History:   Procedure Laterality Date    HEENT      wisdom teeth         Lab Results   Component Value Date    WBC 8.7 02/09/2024    HGB 14.9 02/09/2024    HCT 44.1 02/09/2024    MCV 92.1 02/09/2024     02/09/2024     Lab Results   Component Value Date    CHOL 185 02/09/2024    TRIG 92 02/09/2024    HDL 69 02/09/2024    LDLCALC 97.6 02/09/2024     Lab Results   Component Value Date    CREATININE 1.06 02/09/2024    BUN 18 02/09/2024     02/09/2024    K 3.8 02/09/2024     02/09/2024    CO2 28 02/09/2024       Review of Systems   Respiratory:  Negative for shortness of breath.    Cardiovascular:  Negative for chest pain.         Physical Exam  Constitutional:

## 2024-04-17 NOTE — PROGRESS NOTES
\"Have you been to the ER, urgent care clinic since your last visit?  Hospitalized since your last visit?\"    NO    “Have you seen or consulted any other health care providers outside of [facility] since your last visit?”    NO          Click Here for Release of Records Request

## 2024-04-17 NOTE — TELEPHONE ENCOUNTER
Called, Spoke with Pt  Received two pt identifiers  Pt states has not taken a covid test yet  Advised pt we can do a vv today but to go ahead and do a covid test  and if neg will come in for flu test  Pt offered and accepted virtual appt for 04/17/24 @ 1pm  Pt verbalizes understanding of the instructions and has no further questions at this time.    Pt verbalizes understanding of the instructions and has no further questions at this time.

## 2024-04-17 NOTE — TELEPHONE ENCOUNTER
Pt has flu or COVID (no test)  He has fever, cough,  chills and body aches.      There are no VV until Friday 4-19-24    Pt would like to be seen today.  Please call pt.

## 2024-06-18 ENCOUNTER — OFFICE VISIT (OUTPATIENT)
Age: 39
End: 2024-06-18
Payer: COMMERCIAL

## 2024-06-18 VITALS
SYSTOLIC BLOOD PRESSURE: 113 MMHG | BODY MASS INDEX: 32.61 KG/M2 | WEIGHT: 215.2 LBS | OXYGEN SATURATION: 98 % | DIASTOLIC BLOOD PRESSURE: 76 MMHG | TEMPERATURE: 97.9 F | HEIGHT: 68 IN | RESPIRATION RATE: 16 BRPM | HEART RATE: 66 BPM

## 2024-06-18 DIAGNOSIS — M25.511 ACUTE PAIN OF RIGHT SHOULDER: Primary | ICD-10-CM

## 2024-06-18 PROCEDURE — 99213 OFFICE O/P EST LOW 20 MIN: CPT | Performed by: INTERNAL MEDICINE

## 2024-06-18 RX ORDER — METHYLPHENIDATE HYDROCHLORIDE 10 MG/1
10 CAPSULE, EXTENDED RELEASE ORAL DAILY
COMMUNITY
Start: 2024-05-08

## 2024-06-18 RX ORDER — DICLOFENAC SODIUM 75 MG/1
75 TABLET, DELAYED RELEASE ORAL 2 TIMES DAILY
Qty: 60 TABLET | Refills: 0 | Status: SHIPPED | OUTPATIENT
Start: 2024-06-18

## 2024-06-18 SDOH — ECONOMIC STABILITY: FOOD INSECURITY: WITHIN THE PAST 12 MONTHS, THE FOOD YOU BOUGHT JUST DIDN'T LAST AND YOU DIDN'T HAVE MONEY TO GET MORE.: NEVER TRUE

## 2024-06-18 SDOH — ECONOMIC STABILITY: FOOD INSECURITY: WITHIN THE PAST 12 MONTHS, YOU WORRIED THAT YOUR FOOD WOULD RUN OUT BEFORE YOU GOT MONEY TO BUY MORE.: NEVER TRUE

## 2024-06-18 SDOH — ECONOMIC STABILITY: INCOME INSECURITY: HOW HARD IS IT FOR YOU TO PAY FOR THE VERY BASICS LIKE FOOD, HOUSING, MEDICAL CARE, AND HEATING?: NOT HARD AT ALL

## 2024-06-18 ASSESSMENT — PATIENT HEALTH QUESTIONNAIRE - PHQ9
SUM OF ALL RESPONSES TO PHQ QUESTIONS 1-9: 0
SUM OF ALL RESPONSES TO PHQ QUESTIONS 1-9: 0
1. LITTLE INTEREST OR PLEASURE IN DOING THINGS: NOT AT ALL
SUM OF ALL RESPONSES TO PHQ QUESTIONS 1-9: 0
SUM OF ALL RESPONSES TO PHQ QUESTIONS 1-9: 0
SUM OF ALL RESPONSES TO PHQ9 QUESTIONS 1 & 2: 0
2. FEELING DOWN, DEPRESSED OR HOPELESS: NOT AT ALL

## 2024-06-18 ASSESSMENT — ENCOUNTER SYMPTOMS: SHORTNESS OF BREATH: 0

## 2024-06-18 NOTE — PROGRESS NOTES
HISTORY OF PRESENT ILLNESS  Sb Womack is a 38 y.o. male.  Shoulder Pain         Last here 4/17/24. Here for acute/routine care         He reports pain in back of R shoulder x 3-4 weeks  States he was practicing pitching for softball,  thinks this would have been the cause, which he stopped doing now  He has not taken anything since then  Advised to give Rest to that shoulder  Discussed PT can help for this, ordered PT  Will give diclofenac for pain            Wt today is 215 lbs, up 6 lbs x lov  Discussed diet and wt/l he is working on portions  Previously gave info for VWW w/ Dr. Anthony, as well as Dr. Robles and Dr. Reid      Reviewed labs             Completed neuro psych testing for ADHD evaluation    He was diagnosed with ADHD  He is currently seeing NP Марина dubois   Lov 5/24, f/u scheduled this week 6/24  He is taking ritalin              Recall has a history of thrombocytopenia  He saw Dr. Allan (onc) for this 12/30/21  Said he did not need to f/U unless platelets dropped significantly   Discussed platelets were stable on last labs     Continues zyrtec OTC 10mg prn for allergies, which works well     Takes vitamin D     States he had a vasectomy 2023       PREVENTIVE:  Colonoscopy: not yet needed, denies FMHx colon cancer   PSA: not yet needed  AAA screen: not yet needed  Tdap: 05/24/18   Pneumovax: not yet needed  Shingrix: not yet needed  Flu shot: declines   Eye exam: 8/23, q annually   Lipids: 2/24 LDL 97   A1C: 11/21 5.2 4/23 5.2 2/24 5.0  Hep B: complete 11/16/21  Hep C: 11/21 neg  Covid: declines         Patient Active Problem List   Diagnosis    Thrombocytopenia (HCC)     Current Outpatient Medications   Medication Sig Dispense Refill    methylphenidate (RITALIN LA) 10 MG extended release capsule Take 1 capsule by mouth daily.      diclofenac (VOLTAREN) 75 MG EC tablet Take 1 tablet by mouth 2 times daily 60 tablet 0    cetirizine (ZYRTEC) 10 MG tablet Take 1 tablet by mouth as needed for

## 2024-06-18 NOTE — PROGRESS NOTES
\"Have you been to the ER, urgent care clinic since your last visit?  Hospitalized since your last visit?\"    NO    “Have you seen or consulted any other health care providers outside of [facility] since your last visit?”    NO          Click Here for Release of Records Request

## 2024-12-20 ENCOUNTER — TELEMEDICINE (OUTPATIENT)
Age: 39
End: 2024-12-20

## 2024-12-20 DIAGNOSIS — J11.1 INFLUENZA: ICD-10-CM

## 2024-12-20 DIAGNOSIS — J06.9 UPPER RESPIRATORY TRACT INFECTION, UNSPECIFIED TYPE: Primary | ICD-10-CM

## 2024-12-20 LAB
EXP DATE SOLUTION: NORMAL
EXP DATE SWAB: NORMAL
EXPIRATION DATE: NORMAL
LOT NUMBER POC: NORMAL
LOT NUMBER SOLUTION: NORMAL
LOT NUMBER SWAB: NORMAL
QUICKVUE INFLUENZA TEST: POSITIVE
SARS-COV-2 RNA, POC: NEGATIVE
VALID INTERNAL CONTROL, POC: YES

## 2024-12-20 RX ORDER — BENZONATATE 200 MG/1
200 CAPSULE ORAL 3 TIMES DAILY PRN
Qty: 30 CAPSULE | Refills: 0 | Status: SHIPPED | OUTPATIENT
Start: 2024-12-20 | End: 2024-12-27

## 2024-12-20 RX ORDER — OSELTAMIVIR PHOSPHATE 75 MG/1
75 CAPSULE ORAL 2 TIMES DAILY
Qty: 10 CAPSULE | Refills: 0 | Status: SHIPPED | OUTPATIENT
Start: 2024-12-20 | End: 2024-12-25

## 2024-12-20 ASSESSMENT — PATIENT HEALTH QUESTIONNAIRE - PHQ9
SUM OF ALL RESPONSES TO PHQ9 QUESTIONS 1 & 2: 0
2. FEELING DOWN, DEPRESSED OR HOPELESS: NOT AT ALL
SUM OF ALL RESPONSES TO PHQ QUESTIONS 1-9: 0
1. LITTLE INTEREST OR PLEASURE IN DOING THINGS: NOT AT ALL
SUM OF ALL RESPONSES TO PHQ QUESTIONS 1-9: 0

## 2024-12-20 ASSESSMENT — ENCOUNTER SYMPTOMS
COUGH: 1
SHORTNESS OF BREATH: 0

## 2024-12-20 NOTE — PROGRESS NOTES
HISTORY OF PRESENT ILLNESS  Sb Womack is a 39 y.o. male.  Cold Symptoms   Associated symptoms include congestion and coughing. Pertinent negatives include no chest pain.       This is an established visit completed with telemedicine was completed with video assist. The patient acknowledges and agrees to this method of visitation.    Last here 6/18/24. Presents for acute care.     He started feeling sick Monday/Tuesday with chills, fever, sob, headache, cough, minimal sore throat. He has been taking ibuprofen and acetaminophen, nyquil and dayquil. He states his kids are sick but he thinks they got it from him. Covid test was negative yesterday. Will test for flu, repeat covid test, if negative will go antibiotics to cover for pneumonia     Patient Active Problem List   Diagnosis    Thrombocytopenia (HCC)     Current Outpatient Medications   Medication Sig Dispense Refill    oseltamivir (TAMIFLU) 75 MG capsule Take 1 capsule by mouth 2 times daily for 5 days 10 capsule 0    cetirizine (ZYRTEC) 10 MG tablet Take 1 tablet by mouth as needed for Allergies      ergocalciferol (ERGOCALCIFEROL) 1.25 MG (44007 UT) capsule Take 1 capsule by mouth every 7 days      methylphenidate (RITALIN LA) 10 MG extended release capsule Take 1 capsule by mouth daily. (Patient not taking: Reported on 12/20/2024)      diclofenac (VOLTAREN) 75 MG EC tablet Take 1 tablet by mouth 2 times daily (Patient not taking: Reported on 12/20/2024) 60 tablet 0     No current facility-administered medications for this visit.     Past Surgical History:   Procedure Laterality Date    HEENT      wisdom teeth         Lab Results   Component Value Date    WBC 8.7 02/09/2024    HGB 14.9 02/09/2024    HCT 44.1 02/09/2024    MCV 92.1 02/09/2024     02/09/2024     Lab Results   Component Value Date    CHOL 185 02/09/2024    TRIG 92 02/09/2024    HDL 69 02/09/2024     Lab Results   Component Value Date    CREATININE 1.06 02/09/2024    BUN 18 02/09/2024